# Patient Record
Sex: MALE | Race: WHITE
[De-identification: names, ages, dates, MRNs, and addresses within clinical notes are randomized per-mention and may not be internally consistent; named-entity substitution may affect disease eponyms.]

---

## 2017-05-11 NOTE — EDPHY
H & P


HPI/ROS: 


CHIEF COMPLAINT: Suicidal Ideation, M1 Hold 





HISTORY OF PRESENT ILLNESS: 


The patient is a deaf 76 year male arriving today via EMS from [facility] on 

an M1 hold for suicidal ideation. Per EMS, he wrote down that he was unhappy 

and had plans to take his life. M1 paperwork states he was on his way to the 

bus station and had made threats to staff at [facility] to take the bus to 

[place] where he could access a gun in order to kill himself. Staff reported 

he has threatened cutting his wrists, throat, and stomach as well. [facility] staff at bedside reports he has had intermittent depression and suicidal 

threats since arriving at [facility]. He has a history of depression, 

possible dementia, and COPD.  





HPI partially obtained by  ( at [facility]) and 

from M1 paperwork.





REVIEW OF SYSTEMS:


Constitutional: No fever, no chills


Eyes: No visual changes


ENT: No sore throat


Respiratory: No cough, no shortness of breath


Cardiac: No chest pain


Gastrointestinal: No nausea, no vomiting, no abdominal pain


Genitourinary: No hematuria, no dysuria


Musculoskeletal: No leg pain or swelling


Skin: No rash


Neurological: No headache, no numbness, no weakness


Psychiatric: As in HPI 








Past Medical/Surgical History: 





COPD, Depression, Deaf, Hypertension (unclear if currently taking medication).


Social History: 





Lives at [facility]. Daughters live in [place] and [place]. Has never 

smoked.


Physical Exam: 


General Appearance: Alert, no distress


Eyes: Pupils equal and round, no conjunctival pallor or injection


ENT, Mouth: Deaf, Mucous membranes moist


Neck: Normal inspection


Respiratory: Lungs are clear to auscultation


Cardiovascular: Regular rate and rhythm 


Gastrointestinal:  Abdomen is soft and non- tender 


Neurological:  A&O, nonfocal, normal gait


Skin:  Warm and dry, no rash


Extremities:  Nontender, no pedal edema


Psychiatric: Appears angry





Constitutional: 


 Initial Vital Signs











Temperature (C)  36.9 C   05/11/17 15:56


 


Heart Rate  78   05/11/17 15:56


 


Respiratory Rate  16   05/11/17 15:56


 


Blood Pressure  171/100 H  05/11/17 15:56


 


O2 Sat (%)  94   05/11/17 15:56








 











O2 Delivery Mode               Room Air














Allergies/Adverse Reactions: 


 





No Known Allergies Allergy (Verified 05/11/17 15:55)


 








Home Medications: 














 Medication  Instructions  Recorded


 


2 Htn Medicines  06/03/11


 


Prostate Med  06/03/11


 


Tamsulosin HCl [Flomax]  mg PO DAILY8 06/03/11














Medical Decision Making


ED Course/Re-evaluation: 


15:48 Took EMS report at bedside. 





This is a deaf 76 year old male on M1 hold for suicidal ideation. Police placed 

him on M1 hold when he was found at a bus stop, reportedly planning to travel 

to [place] to acquire a gun and shoot himself. [facility] staff reports 

he has made suicidal statements often for years, but has no prior attempts. On 

presentation he appears angry, but is otherwise alert and stable. Denies SI.





Will proceed with labs and UA for medical clearance prior to mental health 

evaluation.  en route.





Labs obtained. Hypokalemic at 3.1 Will administer 20 meq PO KCl. Tox screen was 

negative.  Plan to proceed with mental evaluation. 





Crisis worker on staff feels that the patient is not acutely suicidal and is 

stable for discharge back to [facility]. I agree with this. The patient will 

be removed from M1 hold at 17:30.





The patient was discharged back to [facility] with strict return precautions 

and instructions for mental health follow-up. 


Differential Diagnosis: 





The differential diagnosis for the patient's depression included but was not 

limited to functional and major depression, situational depression, medication 

side effect, drugs, and alcohol abuse.





- Data Points


Laboratory Results: 


 Laboratory Results





 05/11/17 16:35 





 05/11/17 16:35 





 











  05/11/17 05/11/17 05/11/17





  16:35 16:35 16:14


 


WBC    8.91 10^3/uL 10^3/uL  





    (3.80-9.50)  


 


RBC    4.69 10^6/uL 10^6/uL  





    (4.40-6.38)  


 


Hgb    14.3 g/dL g/dL  





    (13.7-17.5)  


 


Hct    42.3 % %  





    (40.0-51.0)  


 


MCV    90.2 fL fL  





    (81.5-99.8)  


 


MCH    30.5 pg pg  





    (27.9-34.1)  


 


MCHC    33.8 g/dL g/dL  





    (32.4-36.7)  


 


RDW    14.1 % %  





    (11.5-15.2)  


 


Plt Count    171 10^3/uL 10^3/uL  





    (150-400)  


 


MPV    10.1 fL fL  





    (8.7-11.7)  


 


Neut % (Auto)    72.0 % %  





    (39.3-74.2)  


 


Lymph % (Auto)    16.4 % %  





    (15.0-45.0)  


 


Mono % (Auto)    7.4 % %  





    (4.5-13.0)  


 


Eos % (Auto)    2.9 % %  





    (0.6-7.6)  


 


Baso % (Auto)    1.0 % %  





    (0.3-1.7)  


 


Nucleat RBC Rel Count    0.0 % %  





    (0.0-0.2)  


 


Absolute Neuts (auto)    6.41 10^3/uL 10^3/uL  





    (1.70-6.50)  


 


Absolute Lymphs (auto)    1.46 10^3/uL 10^3/uL  





    (1.00-3.00)  


 


Absolute Monos (auto)    0.66 10^3/uL 10^3/uL  





    (0.30-0.80)  


 


Absolute Eos (auto)    0.26 10^3/uL 10^3/uL  





    (0.03-0.40)  


 


Absolute Basos (auto)    0.09 10^3/uL 10^3/uL  





    (0.02-0.10)  


 


Absolute Nucleated RBC    0.00 10^3/uL 10^3/uL  





    (0-0.01)  


 


Immature Gran %    0.3 % %  





    (0.0-1.1)  


 


Immature Gran #    0.03 10^3/uL 10^3/uL  





    (0.00-0.10)  


 


Sodium  143 mEq/L mEq/L    





   (134-144)   


 


Potassium  3.1 mEq/L L mEq/L    





   (3.5-5.2)   


 


Chloride  107 mEq/L mEq/L    





   ()   


 


Carbon Dioxide  25 mEq/l mEq/l    





   (22-31)   


 


Anion Gap  11 mEq/L mEq/L    





   (8-16)   


 


BUN  25 mg/dL H mg/dL    





   (7-23)   


 


Creatinine  1.2 mg/dL mg/dL    





   (0.7-1.3)   


 


Estimated GFR  59     





    


 


Glucose  114 mg/dL H mg/dL    





   ()   


 


Calcium  9.8 mg/dL mg/dL    





   (8.5-10.4)   


 


Urine Opiates Screen      NEGATIVE 





     (NEGATIVE) 


 


Urine Barbiturates      NEGATIVE 





     (NEGATIVE) 


 


Ur Phencyclidine Scrn      NEGATIVE 





     (NEGATIVE) 


 


Ur Amphetamine Screen      NEGATIVE 





     (NEGATIVE) 


 


U Benzodiazepines Scrn      NEGATIVE 





     (NEGATIVE) 


 


Urine Cocaine Screen      NEGATIVE 





     (NEGATIVE) 


 


U Marijuana (THC) Screen      NEGATIVE 





     (NEGATIVE) 


 


Ethyl Alcohol  < 10 mg/dL mg/dL    





   (0-10)   











Medications Given: 


 








Discontinued Medications





Potassium Chloride (Klor-Con)  20 meq PO EDNOW ONE


   Stop: 05/11/17 17:10


   Last Admin: 05/11/17 17:48 Dose:  Not Given








Departure





- Departure


Disposition: Home, Routine, Self-Care


Clinical Impression: 


 Suicidal ideation





Condition: Good


Instructions:  Depression (ED), Suicide Prevention for Older Adults (ED)


Additional Instructions: 


1. Return to the Emergency Department if you feel as though you might hurt 

yourself or others, feel anxious, or for other serious concerns. 


Referrals: 


BENNIE GARNETT [Primary Care Provider] - As per Instructions


Report Scribed for: Magui Talavera


Report Scribed by: Margaret Urias


Date of Report: 05/11/17


Time of Report: 16:08


Physician Review and Approval Statement: 





05/11/17 16:13


Portions of this note were transcribed by a medical scribe.  I personally 

performed a history, physical exam, medical decision making, and confirmed 

accuracy of information the transcribed note.

## 2017-08-14 ENCOUNTER — HOSPITAL ENCOUNTER (EMERGENCY)
Dept: HOSPITAL 80 - FED | Age: 77
Discharge: HOME | End: 2017-08-14
Payer: COMMERCIAL

## 2017-08-14 VITALS — OXYGEN SATURATION: 92 %

## 2017-08-14 VITALS
RESPIRATION RATE: 16 BRPM | TEMPERATURE: 97.9 F | HEART RATE: 78 BPM | SYSTOLIC BLOOD PRESSURE: 145 MMHG | DIASTOLIC BLOOD PRESSURE: 76 MMHG

## 2017-08-14 DIAGNOSIS — J44.9: ICD-10-CM

## 2017-08-14 DIAGNOSIS — R33.9: Primary | ICD-10-CM

## 2017-08-14 DIAGNOSIS — I10: ICD-10-CM

## 2017-08-14 LAB
% IMMATURE GRANULYOCYTES: 0.5 % (ref 0–1.1)
ABSOLUTE IMMATURE GRANULOCYTES: 0.07 10^3/UL (ref 0–0.1)
ABSOLUTE NRBC COUNT: 0 10^3/UL (ref 0–0.01)
ADD DIFF?: NO
ADD MORPH?: NO
ADD SCAN?: NO
ANION GAP SERPL CALC-SCNC: 17 MEQ/L (ref 8–16)
ATYPICAL LYMPHOCYTE FLAG: 0 (ref 0–99)
CALCIUM SERPL-MCNC: 10 MG/DL (ref 8.5–10.4)
CHLORIDE SERPL-SCNC: 106 MEQ/L (ref 97–110)
CO2 SERPL-SCNC: 21 MEQ/L (ref 22–31)
COLOR UR: (no result)
CREAT SERPL-MCNC: 1.1 MG/DL (ref 0.7–1.3)
ERYTHROCYTE [DISTWIDTH] IN BLOOD BY AUTOMATED COUNT: 13.6 % (ref 11.5–15.2)
FRAGMENT RBC FLAG: 0 (ref 0–99)
GFR SERPL CREATININE-BSD FRML MDRD: > 60 ML/MIN/{1.73_M2}
GLUCOSE SERPL-MCNC: 151 MG/DL (ref 70–100)
HCT VFR BLD CALC: 45.8 % (ref 40–51)
HGB BLD-MCNC: 15.5 G/DL (ref 13.7–17.5)
LEFT SHIFT FLG: 0 (ref 0–99)
LIPEMIA HEMOLYSIS FLAG: 90 (ref 0–99)
MCH RBC BLDCO QN: 30.8 PG (ref 27.9–34.1)
MCHC RBC AUTO-ENTMCNC: 33.8 G/DL (ref 32.4–36.7)
MCV RBC AUTO: 91.1 FL (ref 81.5–99.8)
MUCOUS THREADS #/AREA URNS LPF: (no result) /LPF
NITRITE UR QL STRIP: NEGATIVE
NRBC-AUTO%: 0 % (ref 0–0.2)
PH UR STRIP: 7 [PH] (ref 5–7.5)
PLATELET # BLD: 212 10^3/UL (ref 150–400)
PLATELET CLUMPS FLAG: 10 (ref 0–99)
PMV BLD AUTO: 10.8 FL (ref 8.7–11.7)
POTASSIUM SERPL-SCNC: 3.6 MEQ/L (ref 3.5–5.2)
RBC # BLD AUTO: 5.03 10^6/UL (ref 4.4–6.38)
RBC #/AREA URNS HPF: (no result) /HPF (ref 0–3)
SODIUM SERPL-SCNC: 144 MEQ/L (ref 134–144)
SP GR UR STRIP: 1.01 (ref 1–1.03)

## 2017-08-14 NOTE — EDPHY
HPI/HX/ROS/PE/MDM


Narrative: 


CHIEF COMPLAINT:  Urinary retention





HISTORY OF PRESENT ILLNESS:   The patient is a hard-of-hearing 78 y/o male 

arriving from [facility], who presents with urinary retention since last 

night. He has a history of BPH and has required catheterization in the past. 

The last time he urinated was before he went to bed last night. He has 

suprapubic pain that is aggravated by lying down and mildly alleviated  while 

standing. He denies vomiting and fever. History obtained via writing as well as 

.  





REVIEW OF SYSTEMS:


Limitied dure to initial communication barrier.





PAST MEDICAL HISTORY:  COPD, dementia, hypertension, GERD, BPH, depression





SOCIAL HISTORY:   Resides at [facility], [place] and [place], PCP: Dr. Lesli Stone





Prior medical records reviewed including [facility] paperwork that was 

transported with patient today.





VITAL SIGNS: Reviewed by me


GENERAL: Elderly, thin, standing, in obvious discomfort, and non-verbal.


HEENT: Atraumatic. Eyes: No icterus, no injection. Mouth: moist mucous 

membranes.  No erythema or lesions. Neck: supple with no adenopathy.


LUNGS: Clear to auscultation bilaterally, no wheezes, rhonchi or rales.


CARDIAC: Irregular rate and rhythm, no rubs, murmurs or gallops.


ABDOMEN: Tenderness and distension over bladder, soft, bowel sounds normal. No 

guarding or rebound.  Bladder palpable above pubic symphysis.


BACK:  No CVA tenderness.


EXTREMITIES: No trauma. No edema.  Range of motion is normal throughout.


NEURO: Alert and able to follow commands and write,  grossly nonfocal.  


SKIN: Warm and dry, no rash.


PSYCHIATRIC: Normal mentation, no agitation.





Portions of this note were transcribed by a medical scribe.  I personally 

performed a history, physical exam, medical decision making, and confirmed 

accuracy of information the transcribed note.








ED Course: 





The patient is a 78 y/o male who presents with a one day history of urinary 

retention. Plan on a catheter to empty bladder and UA, patient agrees to 

catheter.  is on her way.





1242: Reassessed patient and discussed work up. He is feeling significantly 

improved. His UA indicates possible UTI so he will be discharged with Keflex. I'

ve recommended keeping the catheter in place until follow up with a urologist 

in 1-2 days. He agrees to this follow up plan. Return precautions given.  ASL 

 present to discuss workup as well as my recommendations and to 

answer his questions.  


MDM: 





diff dx for patients urinary retention was considered including UTI, BPH, 

medication effects, bladder outlet pathology, renal failure.





- Data Points


Laboratory Results: 


 Laboratory Results





 08/14/17 09:30 





 08/14/17 09:30 








Medications Given: 


 








Discontinued Medications





Cephalexin HCl (Keflex)  500 mg PO EDNOW ONE


   PRN Reason: Protocol


   Stop: 08/14/17 12:16


   Last Admin: 08/14/17 12:26 Dose:  500 mg





Microbiology Results: 


 MICROBIOLOGY





08/14/17 11:30   Urine,Clean Catch   Urine Culture - Final








General


Time Seen by Provider: 08/14/17 09:44


Initial Vital Signs: 


 Initial Vital Signs











Temperature (C)  37 C   08/14/17 09:20


 


Heart Rate  88   08/14/17 09:20


 


Respiratory Rate  19   08/14/17 09:20


 


Blood Pressure  169/102 H  08/14/17 09:20


 


O2 Sat (%)  92   08/14/17 09:20








 











O2 Delivery Mode               Room Air














Allergies/Adverse Reactions: 


 





No Known Allergies Allergy (Verified 05/11/17 15:55)


 








Home Medications: 














 Medication  Instructions  Recorded


 


2 Htn Medicines  06/03/11


 


Prostate Med  06/03/11


 


Tamsulosin HCl [Flomax]  mg PO DAILY8 06/03/11


 


Cephalexin [Keflex (RX)] 500 mg PO TID 6 Days 08/14/17














Departure





- Departure


Disposition: Home, Routine, Self-Care


Clinical Impression: 


 Urinary retention





Condition: Good


Instructions:  Urinary Retention in Men (ED), Rowan Catheter Placement and Care 

(ED)


Additional Instructions: 


1. Keep your Rowan catheter in place until follow up with urologist.


2. Follow up with your urologist within the next two days. You've been referred 

to Dr. Oseguera if needed.


3. Return to the ED for severe pain, inability to empty your bladder, fever, or 

other worsening symptoms.


4. Take Keflex as prescribed. Be sure to complete entire prescription.


Referrals: 


Patient,NotPresent [Unknown] - As per Instructions


Jeffrey Oseguera MD [Medical Doctor] - As per Instructions


Prescriptions: 


Cephalexin [Keflex (RX)] 500 mg PO TID 6 Days


Report Scribed for: Earlene Elizabeth


Report Scribed by: Candi Martinez


Date of Report: 08/14/17


Time of Report: 10:05

## 2017-09-27 ENCOUNTER — HOSPITAL ENCOUNTER (EMERGENCY)
Dept: HOSPITAL 80 - FED | Age: 77
Discharge: HOME | End: 2017-09-27
Payer: COMMERCIAL

## 2017-09-27 VITALS
SYSTOLIC BLOOD PRESSURE: 155 MMHG | OXYGEN SATURATION: 94 % | DIASTOLIC BLOOD PRESSURE: 88 MMHG | RESPIRATION RATE: 20 BRPM | HEART RATE: 78 BPM

## 2017-09-27 VITALS — TEMPERATURE: 98.4 F

## 2017-09-27 DIAGNOSIS — F03.90: Primary | ICD-10-CM

## 2017-09-27 DIAGNOSIS — I10: ICD-10-CM

## 2017-09-27 DIAGNOSIS — F17.200: ICD-10-CM

## 2017-09-27 LAB
% IMMATURE GRANULYOCYTES: 0.4 % (ref 0–1.1)
ABSOLUTE IMMATURE GRANULOCYTES: 0.03 10^3/UL (ref 0–0.1)
ABSOLUTE NRBC COUNT: 0 10^3/UL (ref 0–0.01)
ADD DIFF?: NO
ADD MORPH?: NO
ADD SCAN?: NO
ANION GAP SERPL CALC-SCNC: 13 MEQ/L (ref 8–16)
ATYPICAL LYMPHOCYTE FLAG: 0 (ref 0–99)
CALCIUM SERPL-MCNC: 10 MG/DL (ref 8.5–10.4)
CHLORIDE SERPL-SCNC: 104 MEQ/L (ref 97–110)
CO2 SERPL-SCNC: 25 MEQ/L (ref 22–31)
COLOR UR: YELLOW
CREAT SERPL-MCNC: 1.2 MG/DL (ref 0.7–1.3)
ERYTHROCYTE [DISTWIDTH] IN BLOOD BY AUTOMATED COUNT: 14.5 % (ref 11.5–15.2)
FRAGMENT RBC FLAG: 0 (ref 0–99)
GFR SERPL CREATININE-BSD FRML MDRD: 58 ML/MIN/{1.73_M2}
GLUCOSE SERPL-MCNC: 104 MG/DL (ref 70–100)
HCT VFR BLD CALC: 43.8 % (ref 40–51)
HGB BLD-MCNC: 14.9 G/DL (ref 13.7–17.5)
LEFT SHIFT FLG: 0 (ref 0–99)
LIPEMIA HEMOLYSIS FLAG: 90 (ref 0–99)
MCH RBC BLDCO QN: 31.2 PG (ref 27.9–34.1)
MCHC RBC AUTO-ENTMCNC: 34 G/DL (ref 32.4–36.7)
MCV RBC AUTO: 91.8 FL (ref 81.5–99.8)
NITRITE UR QL STRIP: NEGATIVE
NRBC-AUTO%: 0 % (ref 0–0.2)
PH UR STRIP: 6 [PH] (ref 5–7.5)
PLATELET # BLD: 163 10^3/UL (ref 150–400)
PLATELET CLUMPS FLAG: 10 (ref 0–99)
PMV BLD AUTO: 9.9 FL (ref 8.7–11.7)
POTASSIUM SERPL-SCNC: 3.6 MEQ/L (ref 3.5–5.2)
RBC # BLD AUTO: 4.77 10^6/UL (ref 4.4–6.38)
RBC #/AREA URNS HPF: (no result) /HPF (ref 0–3)
SODIUM SERPL-SCNC: 142 MEQ/L (ref 134–144)
SP GR UR STRIP: 1.01 (ref 1–1.03)
WBC #/AREA URNS HPF: (no result) /HPF (ref 0–3)

## 2017-09-27 NOTE — EDPHY
H & P


Time Seen by Provider: 09/27/17 19:28


HPI/ROS: 





CHIEF COMPLAINT:  Mild confusion





HISTORY OF PRESENT ILLNESS:  The patient presents to the emergency department 

via paramedics from [facility] with a reported history of increasing 

confusion and mild agitation.  The patient is deaf.  Patient also has a history 

of mild dementia.  In reviewing his records he was seen in the emergency 

department approximately 6 weeks ago with a similar presentation.  At that 

point time he was noted to have a possible urinary tract infection however his 

urine culture demonstrated no obvious bacterial growth.  There has been no 

history of fever, vomiting, diarrhea, acute trauma or other significant 

abnormality reported to nursing staff by paramedics.





REVIEW OF SYSTEMS:


A comprehensive 10 point review of systems is otherwise negative aside from 

elements mentioned in the history of present illness.


Source: Patient, Old records


Exam Limitations: Clinical condition





- Medical/Surgical History


Hx Asthma: No


Hx Chronic Respiratory Disease: No


Hx Diabetes: No


Hx Cardiac Disease: No


Hx Renal Disease: No


Hx Cirrhosis: No


Hx Alcoholism: No


Hx HIV/AIDS: No


Hx Splenectomy or Spleen Trauma: No


Other PMH: HTN, early dementia and pt is deaf





- Social History


Smoking Status: Heavy smoker





- Physical Exam


Exam: 





General Appearance:  Elderly male, no acute distress


Eyes:  Pupils equal and round no pallor or injection


ENT, Mouth:  Dry mucous membranes


Respiratory:  There are no retractions, lungs are clear to auscultation


Cardiovascular:  Regular rate and rhythm


Gastrointestinal:  Abdomen is soft and nontender, no masses, bowel sounds normal


Neurological:  5/5 strength noted all 4 extremities, chronically deaf


Skin:  Warm and dry, no rashes


Musculoskeletal:  Neck is supple nontender


Extremities:  symmetrical, full range of motion


Psychiatric:  Cooperative, non agitated


Constitutional: 


 Initial Vital Signs











Temperature (C)  36.9 C   09/27/17 19:54


 


Heart Rate  93   09/27/17 19:54


 


Respiratory Rate  18   09/27/17 19:54


 


Blood Pressure  153/77 H  09/27/17 19:54


 


O2 Sat (%)  93   09/27/17 19:54








 











O2 Delivery Mode               Room Air














Allergies/Adverse Reactions: 


 





No Known Allergies Allergy (Verified 05/11/17 15:55)


 








Home Medications: 














 Medication  Instructions  Recorded


 


2 Htn Medicines  06/03/11


 


Prostate Med  06/03/11


 


Tamsulosin HCl [Flomax] mg PO DAILY8 06/03/11


 


Cephalexin [Keflex (RX)] 500 mg PO TID 6 Days  cap 08/14/17














Medical Decision Making


ED Course/Re-evaluation: 





Through the use of a  the patient informs me that he 

was agitated with staff that he could not receive a meal at his skilled nursing 

facility.  The patient was given a meal in the emergency department and is 

feeling much better.  He would like to be discharged back to his SNF.  The 

patient has stable vital signs.  His urine dipstick demonstrates no evidence of 

an acute infection.  The patient's CBC and serum chemistries are within normal 

limits.  At this point time I doubt the patient is presenting with acute 

delirium or psychosis.  I do feel the patient can be discharged back to his 

skilled nursing facility.








Differential Diagnosis: 





Differential diagnosis considered includes urinary tract infection, dehydration

, metabolic abnormality, psychosis, delirium





- Data Points


Laboratory Results: 


 Laboratory Results





 09/27/17 19:52 





 09/27/17 19:52 





 











  09/27/17 09/27/17 09/27/17





  19:52 19:52 19:52


 


WBC      8.47 10^3/uL 10^3/uL





     (3.80-9.50) 


 


RBC      4.77 10^6/uL 10^6/uL





     (4.40-6.38) 


 


Hgb      14.9 g/dL g/dL





     (13.7-17.5) 


 


Hct      43.8 % %





     (40.0-51.0) 


 


MCV      91.8 fL fL





     (81.5-99.8) 


 


MCH      31.2 pg pg





     (27.9-34.1) 


 


MCHC      34.0 g/dL g/dL





     (32.4-36.7) 


 


RDW      14.5 % %





     (11.5-15.2) 


 


Plt Count      163 10^3/uL 10^3/uL





     (150-400) 


 


MPV      9.9 fL fL





     (8.7-11.7) 


 


Neut % (Auto)      72.3 % %





     (39.3-74.2) 


 


Lymph % (Auto)      18.3 % %





     (15.0-45.0) 


 


Mono % (Auto)      8.0 % %





     (4.5-13.0) 


 


Eos % (Auto)      0.4 % L %





     (0.6-7.6) 


 


Baso % (Auto)      0.6 % %





     (0.3-1.7) 


 


Nucleat RBC Rel Count      0.0 % %





     (0.0-0.2) 


 


Absolute Neuts (auto)      6.13 10^3/uL 10^3/uL





     (1.70-6.50) 


 


Absolute Lymphs (auto)      1.55 10^3/uL 10^3/uL





     (1.00-3.00) 


 


Absolute Monos (auto)      0.68 10^3/uL 10^3/uL





     (0.30-0.80) 


 


Absolute Eos (auto)      0.03 10^3/uL 10^3/uL





     (0.03-0.40) 


 


Absolute Basos (auto)      0.05 10^3/uL 10^3/uL





     (0.02-0.10) 


 


Absolute Nucleated RBC      0.00 10^3/uL 10^3/uL





     (0-0.01) 


 


Immature Gran %      0.4 % %





     (0.0-1.1) 


 


Immature Gran #      0.03 10^3/uL 10^3/uL





     (0.00-0.10) 


 


Sodium    142 mEq/L mEq/L  





    (134-144)  


 


Potassium    3.6 mEq/L mEq/L  





    (3.5-5.2)  


 


Chloride    104 mEq/L mEq/L  





    ()  


 


Carbon Dioxide    25 mEq/l mEq/l  





    (22-31)  


 


Anion Gap    13 mEq/L mEq/L  





    (8-16)  


 


BUN    32 mg/dL H mg/dL  





    (7-23)  


 


Creatinine    1.2 mg/dL mg/dL  





    (0.7-1.3)  


 


Estimated GFR    58   





    


 


Glucose    104 mg/dL H mg/dL  





    ()  


 


Calcium    10.0 mg/dL mg/dL  





    (8.5-10.4)  


 


Urine Color  YELLOW     





    


 


Urine Appearance  CLEAR     





    


 


Urine pH  6.0     





   (5.0-7.5)   


 


Ur Specific Gravity  1.015     





   (1.002-1.030)   


 


Urine Protein  1+  H     





   (NEGATIVE)   


 


Urine Ketones  NEGATIVE     





   (NEGATIVE)   


 


Urine Blood  NEGATIVE     





   (NEGATIVE)   


 


Urine Nitrate  NEGATIVE     





   (NEGATIVE)   


 


Urine Bilirubin  NEGATIVE     





   (NEGATIVE)   


 


Urine Urobilinogen  NEGATIVE EU EU    





   (0.2-1.0)   


 


Ur Leukocyte Esterase  NEGATIVE     





   (NEGATIVE)   


 


Urine RBC  3-5 /hpf H /hpf    





   (0-3)   


 


Urine WBC  1-3 /hpf /hpf    





   (0-3)   


 


Ur Epithelial Cells  TRACE /lpf /lpf    





   (NONE-1+)   


 


Urine Glucose  NEGATIVE     





   (NEGATIVE)   














Departure





- Departure


Disposition: Home, Routine, Self-Care


Clinical Impression: 


 Dementia





Condition: Good


Instructions:  Dementia (ED)


Additional Instructions: 


1.  Please return to the emergency department for any fever, vomiting, pain or 

other concerns.


2.  Please follow up with your primary care provider as scheduled.


3.  The testing in the emergency department today demonstrates no evidence of 

urinary retention or a urinary tract infection.  Your laboratory tests 

including CBC and serum chemistries are within normal limits.








Referrals: 


Patient,NotPresent [Unknown] - As per Instructions

## 2017-10-19 ENCOUNTER — HOSPITAL ENCOUNTER (INPATIENT)
Dept: HOSPITAL 80 - FED | Age: 77
LOS: 3 days | Discharge: NURSING, REHAB OR LONG-TERM CARE | DRG: 866 | End: 2017-10-22
Attending: INTERNAL MEDICINE | Admitting: FAMILY MEDICINE
Payer: COMMERCIAL

## 2017-10-19 DIAGNOSIS — F05: ICD-10-CM

## 2017-10-19 DIAGNOSIS — H91.93: ICD-10-CM

## 2017-10-19 DIAGNOSIS — R82.71: ICD-10-CM

## 2017-10-19 DIAGNOSIS — I10: ICD-10-CM

## 2017-10-19 DIAGNOSIS — K21.9: ICD-10-CM

## 2017-10-19 DIAGNOSIS — J44.9: ICD-10-CM

## 2017-10-19 DIAGNOSIS — F17.210: ICD-10-CM

## 2017-10-19 DIAGNOSIS — N40.0: ICD-10-CM

## 2017-10-19 DIAGNOSIS — F03.90: ICD-10-CM

## 2017-10-19 DIAGNOSIS — B34.9: Primary | ICD-10-CM

## 2017-10-19 LAB
% IMMATURE GRANULYOCYTES: 0.4 % (ref 0–1.1)
ABSOLUTE IMMATURE GRANULOCYTES: 0.08 10^3/UL (ref 0–0.1)
ABSOLUTE NRBC COUNT: 0 10^3/UL (ref 0–0.01)
ADD DIFF?: NO
ADD MORPH?: NO
ADD SCAN?: NO
ANION GAP SERPL CALC-SCNC: 11 MEQ/L (ref 8–16)
APTT BLD: 33.7 SEC (ref 23–38)
ATYPICAL LYMPHOCYTE FLAG: 0 (ref 0–99)
BILIRUB SERPL-MCNC: 1.3 MG/DL (ref 0.1–1.4)
CALCIUM SERPL-MCNC: 9.8 MG/DL (ref 8.5–10.4)
CHLORIDE SERPL-SCNC: 106 MEQ/L (ref 97–110)
CO2 SERPL-SCNC: 25 MEQ/L (ref 22–31)
COLOR UR: YELLOW
CREAT SERPL-MCNC: 1.2 MG/DL (ref 0.7–1.3)
ERYTHROCYTE [DISTWIDTH] IN BLOOD BY AUTOMATED COUNT: 14.2 % (ref 11.5–15.2)
FRAGMENT RBC FLAG: 0 (ref 0–99)
GFR SERPL CREATININE-BSD FRML MDRD: 58 ML/MIN/{1.73_M2}
GLUCOSE SERPL-MCNC: 132 MG/DL (ref 70–100)
HCT VFR BLD CALC: 43.6 % (ref 40–51)
HGB BLD-MCNC: 15 G/DL (ref 13.7–17.5)
HYALINE CASTS #/AREA URNS LPF: (no result) /LPF (ref 0–1)
INR PPP: 1.19 (ref 0.83–1.16)
LEFT SHIFT FLG: 0 (ref 0–99)
LIPEMIA HEMOLYSIS FLAG: 90 (ref 0–99)
MCH RBC BLDCO QN: 31.2 PG (ref 27.9–34.1)
MCHC RBC AUTO-ENTMCNC: 34.4 G/DL (ref 32.4–36.7)
MCV RBC AUTO: 90.6 FL (ref 81.5–99.8)
MUCOUS THREADS #/AREA URNS LPF: (no result) /LPF
NITRITE UR QL STRIP: NEGATIVE
NRBC-AUTO%: 0 % (ref 0–0.2)
PH UR STRIP: 6 [PH] (ref 5–7.5)
PLATELET # BLD: 159 10^3/UL (ref 150–400)
PLATELET CLUMPS FLAG: 20 (ref 0–99)
PMV BLD AUTO: 10.3 FL (ref 8.7–11.7)
POTASSIUM SERPL-SCNC: 3.7 MEQ/L (ref 3.5–5.2)
PROTHROMBIN TIME: 15.1 SEC (ref 12–15)
RBC # BLD AUTO: 4.81 10^6/UL (ref 4.4–6.38)
RBC #/AREA URNS HPF: (no result) /HPF (ref 0–3)
SODIUM SERPL-SCNC: 142 MEQ/L (ref 134–144)
SP GR UR STRIP: 1.02 (ref 1–1.03)
WBC #/AREA URNS HPF: (no result) /HPF (ref 0–3)

## 2017-10-19 RX ADMIN — PROPRANOLOL HYDROCHLORIDE SCH MG: 40 TABLET ORAL at 20:35

## 2017-10-19 NOTE — EDPHY
H & P


Time Seen by Provider: 10/19/17 16:25


HPI/ROS: 





CHIEF COMPLAINT:  Fell twice today





HISTORY OF PRESENT ILLNESS:  Patient is a resident of [facility] with a 

history of dementia and prostatic hypertrophy.  He is brought in today because 

he fell twice.


Patient is deaf and communicates by writing.  He currently has no complaints.  

He specifically denies any pain, cough, shortness of breath, abdominal pain or 

vomiting or diarrhea.





REVIEW OF SYSTEMS:


Further history and review of systems is limited by the patient's deafness, but 

generally denies any medical complaints or traumatic injury.





PAST MEDICAL HISTORY:  Includes dementia, prostatic hypertrophy, hypertension, 

COPD, GERD





Social history:  [facility] resident





General Appearance: Alert and conversant, cooperative.


Eyes: No scleral  icterus. 


ENT, Mouth: Normal mucous membranes.


Respiratory: Normal respiratory effort, breath sounds equal, lungs are clear to 

auscultation.


Cardiovascular:  Regular rate and rhythm.


Gastrointestinal:  Abdomen is soft and non tender.


Neurological:  Patient is alert and follows commands.   Communicates by 

writing.  Face symmetric, normal movement and sensation in all extremities.


Skin: Warm and dry, no rashes.


Musculoskeletal:  No spinal or extremity tenderness or deformity.  No external 

evidence of head trauma.


Psychiatric:  Not agitated, but unable because he is nonverbal.





Emergency Department course/MDM:


Temperature 37.9degrees.


Labs, lactate, urinalysis, chest x-ray.


Discussed with Dr. Solorzano at 1656, [facility].  She requests keep at [facility] 

for admission tonight, admission for IV antibiotics with UTI and new gait 

instability.





1725:  Head CT negative for trauma per Oppenheimer, chest x-ray negative for 

pneumonia personally reviewed by myself.





Sepsis but not severe sepsis.  IV fluids and ceftriaxone 1g, observation 

admission.


Patient is prescribed Klonopin, 0.5 mg orally and 5 mg oral Zyprexa.


Right now he is alert and ambulatory and a little bit agitated.  I think he has 

clear dementia and although he seems upset about admission I do not think he 

has capacity to make decisions about being discharged and he is full cor.


Smoking Status: Heavy smoker


Constitutional: 


 Initial Vital Signs











Temperature (C)  37.8 C   10/19/17 17:20


 


Heart Rate  87   10/19/17 17:20


 


Respiratory Rate  16   10/19/17 17:20


 


Blood Pressure  158/91 H  10/19/17 17:20


 


O2 Sat (%)  92   10/19/17 17:20








 











O2 Delivery Mode               Room Air














Allergies/Adverse Reactions: 


 





iodine Allergy (Verified 10/19/17 17:47)


 








Home Medications: 














 Medication  Instructions  Recorded


 


Acetaminophen [Tylenol 325mg (*)] 650 mg PO Q6 PRN 10/19/17


 


Albuterol [Proventil Inhaler HFA 2 puffs IH Q4H PRN 10/19/17





(*)]  


 


Cholecalciferol Vit D3 [Vitamin D3 1,000 units PO DAILY 10/19/17





(*)]  


 


Escitalopram Oxalate [Lexapro] 10 mg PO DAILY 10/19/17


 


Finasteride [Proscar 5 MG (*)] 5 mg PO DAILY 10/19/17


 


Propranolol HCl 80 mg PO BID 10/19/17


 


SUMAtriptan [Imitrex 50 MG (*)] 50 mg PO DAILY PRN 10/19/17


 


Sennosides [Senokot 8.6mg (OTC)] 1 each PO BID PRN 10/19/17


 


Tiotropium Inhaler [Spiriva 1 inh IH DAILY 10/19/17





Inhaler]  


 


clonazePAM [Klonopin (*)] 0.5 mg PO HS 10/19/17


 


clonazePAM [Klonopin (*)] 0.5 mg PO Q8 PRN 10/19/17


 


traZODone [traZODONE 50MG (*)] 50 mg PO HS 10/19/17














Medical Decision Making





- Diagnostics


Imaging Results: 


 Imaging Impressions





Chest X-Ray  10/19/17 16:25


Impression: There is no focal infiltrate identified.








Head CT  10/19/17 16:28


Impression:1. No acute posttraumatic abnormality identified.


2. Severe cerebral atrophy with extensive likely chronic white matter disease 

and multiple old lacunes.


 


Results called and discussed with CHANA STONE, at 10/19/2017 17:24


 


General information for patients regarding this examination can be found at 

[email or web address].


 


If you have questions or comments about this report, please contact me at [phone] (hospital) or [phone] (cell). 


 











Differential Diagnosis: 





Differential for fever and falling considered including but not limited to 

pneumonia, UTI, influenza, sepsis.


Consult/Admit Bed Type: [place] [id number]





- Data Points


Laboratory Results: 


 Laboratory Results





 10/19/17 17:00 





 10/19/17 17:00 





 











  10/19/17 10/19/17 10/19/17





  17:00 17:00 17:00


 


WBC      20.01 10^3/uL H 10^3/uL





     (3.80-9.50) 


 


RBC      4.81 10^6/uL 10^6/uL





     (4.40-6.38) 


 


Hgb      15.0 g/dL g/dL





     (13.7-17.5) 


 


Hct      43.6 % %





     (40.0-51.0) 


 


MCV      90.6 fL fL





     (81.5-99.8) 


 


MCH      31.2 pg pg





     (27.9-34.1) 


 


MCHC      34.4 g/dL g/dL





     (32.4-36.7) 


 


RDW      14.2 % %





     (11.5-15.2) 


 


Plt Count      159 10^3/uL 10^3/uL





     (150-400) 


 


MPV      10.3 fL fL





     (8.7-11.7) 


 


Neut % (Auto)      87.3 % H %





     (39.3-74.2) 


 


Lymph % (Auto)      5.3 % L %





     (15.0-45.0) 


 


Mono % (Auto)      6.7 % %





     (4.5-13.0) 


 


Eos % (Auto)      0.0 % L %





     (0.6-7.6) 


 


Baso % (Auto)      0.3 % %





     (0.3-1.7) 


 


Nucleat RBC Rel Count      0.0 % %





     (0.0-0.2) 


 


Absolute Neuts (auto)      17.45 10^3/uL H 10^3/uL





     (1.70-6.50) 


 


Absolute Lymphs (auto)      1.06 10^3/uL 10^3/uL





     (1.00-3.00) 


 


Absolute Monos (auto)      1.35 10^3/uL H 10^3/uL





     (0.30-0.80) 


 


Absolute Eos (auto)      0.01 10^3/uL L 10^3/uL





     (0.03-0.40) 


 


Absolute Basos (auto)      0.06 10^3/uL 10^3/uL





     (0.02-0.10) 


 


Absolute Nucleated RBC      0.00 10^3/uL 10^3/uL





     (0-0.01) 


 


Immature Gran %      0.4 % %





     (0.0-1.1) 


 


Immature Gran #      0.08 10^3/uL 10^3/uL





     (0.00-0.10) 


 


PT    15.1 SEC H SEC  





    (12.0-15.0)  


 


INR    1.19  H   





    (0.83-1.16)  


 


APTT    33.7 SEC SEC  





    (23.0-38.0)  


 


VBG Lactic Acid      





    


 


Sodium  142 mEq/L mEq/L    





   (134-144)   


 


Potassium  3.7 mEq/L mEq/L    





   (3.5-5.2)   


 


Chloride  106 mEq/L mEq/L    





   ()   


 


Carbon Dioxide  25 mEq/l mEq/l    





   (22-31)   


 


Anion Gap  11 mEq/L mEq/L    





   (8-16)   


 


BUN  34 mg/dL H mg/dL    





   (7-23)   


 


Creatinine  1.2 mg/dL mg/dL    





   (0.7-1.3)   


 


Estimated GFR  58     





    


 


Glucose  132 mg/dL H mg/dL    





   ()   


 


Calcium  9.8 mg/dL mg/dL    





   (8.5-10.4)   


 


Total Bilirubin  1.3 mg/dL mg/dL    





   (0.1-1.4)   


 


Urine Color      





    


 


Urine Appearance      





    


 


Urine pH      





    


 


Ur Specific Gravity      





    


 


Urine Protein      





    


 


Urine Ketones      





    


 


Urine Blood      





    


 


Urine Nitrate      





    


 


Urine Bilirubin      





    


 


Urine Urobilinogen      





    


 


Ur Leukocyte Esterase      





    


 


Urine RBC      





    


 


Urine WBC      





    


 


Ur Epithelial Cells      





    


 


Hyaline Casts      





    


 


Urine Mucus      





    


 


Urine Glucose      





    














  10/19/17 10/19/17





  17:00 16:35


 


WBC    





   


 


RBC    





   


 


Hgb    





   


 


Hct    





   


 


MCV    





   


 


MCH    





   


 


MCHC    





   


 


RDW    





   


 


Plt Count    





   


 


MPV    





   


 


Neut % (Auto)    





   


 


Lymph % (Auto)    





   


 


Mono % (Auto)    





   


 


Eos % (Auto)    





   


 


Baso % (Auto)    





   


 


Nucleat RBC Rel Count    





   


 


Absolute Neuts (auto)    





   


 


Absolute Lymphs (auto)    





   


 


Absolute Monos (auto)    





   


 


Absolute Eos (auto)    





   


 


Absolute Basos (auto)    





   


 


Absolute Nucleated RBC    





   


 


Immature Gran %    





   


 


Immature Gran #    





   


 


PT    





   


 


INR    





   


 


APTT    





   


 


VBG Lactic Acid  0.9 mmol/L mmol/L  





   (0.7-2.1)  


 


Sodium    





   


 


Potassium    





   


 


Chloride    





   


 


Carbon Dioxide    





   


 


Anion Gap    





   


 


BUN    





   


 


Creatinine    





   


 


Estimated GFR    





   


 


Glucose    





   


 


Calcium    





   


 


Total Bilirubin    





   


 


Urine Color    YELLOW 





   


 


Urine Appearance    CLEAR 





   


 


Urine pH    6.0 





    (5.0-7.5) 


 


Ur Specific Gravity    1.016 





    (1.002-1.030) 


 


Urine Protein    1+  H 





    (NEGATIVE) 


 


Urine Ketones    NEGATIVE 





    (NEGATIVE) 


 


Urine Blood    NEGATIVE 





    (NEGATIVE) 


 


Urine Nitrate    NEGATIVE 





    (NEGATIVE) 


 


Urine Bilirubin    NEGATIVE 





    (NEGATIVE) 


 


Urine Urobilinogen    2.0 EU H EU





    (0.2-1.0) 


 


Ur Leukocyte Esterase    TRACE  H 





    (NEGATIVE) 


 


Urine RBC    3-5 /hpf H /hpf





    (0-3) 


 


Urine WBC    25-50 /hpf H /hpf





    (0-3) 


 


Ur Epithelial Cells    TRACE /lpf /lpf





    (NONE-1+) 


 


Hyaline Casts    1-5 /lpf /lpf





    (0-1) 


 


Urine Mucus    TRACE /lpf /lpf





    (NONE-1+) 


 


Urine Glucose    NEGATIVE 





    (NEGATIVE) 











Medications Given: 


 








Discontinued Medications





Clonazepam (Klonopin)  0.5 mg PO EDNOW ONE


   Stop: 10/19/17 17:32


   Last Admin: 10/19/17 18:23 Dose:  0.5 mg


Ceftriaxone Sodium/Dextrose (Rocephin 1 Gm (Premix))  50 mls @ 100 mls/hr IV 

EDNOW ONE


   PRN Reason: Protocol


   Stop: 10/19/17 17:56


   Last Admin: 10/19/17 18:22 Dose:  50 mls


Sodium Chloride (Ns)  1,000 mls @ 0 mls/hr IV EDNOW ONE; Wide Open


   PRN Reason: Protocol


   Stop: 10/19/17 17:28


   Last Admin: 10/19/17 18:23 Dose:  1,000 mls


Olanzapine (Zyprexa Zydis)  5 mg PO EDNOW ONE


   Stop: 10/19/17 17:33


   Last Admin: 10/19/17 18:23 Dose:  5 mg








Departure





- Departure


Disposition: [facility]


Clinical Impression: 


 Pyelonephritis, acute





Condition: Good

## 2017-10-19 NOTE — PDGENHP
History and Physical





- Chief Complaint


weakness/fell





- History of Present Illness


This is a 78 yo male from [facility] who has fallen multiple times in the 

past 3 days. He is deaf and there is a  in the room. 

He has remained afebrile. He is slightly confused. He says he has been feeling 

weak lately and has fallen several times. BP stable. No tachycardia.





In the E.D. UA is c/w likely acute cystitis. WBC is elevated at 20. 





He denies Resp sx's. He does not have abd pain. He has had some increased 

urinary frequency but no urgency.  lactic acid was unremarkable. BMP 

unremarkable








PMHx:





-Dementia


-Deafness


-BPH


-HTN


-COPD, on RA


-GERD





Soc Hx: tobacco abuse disorder, lives in nursing home





FmHx: unknown





History Information





- Allergies/Home Medication List


Allergies/Adverse Reactions: 








iodine Allergy (Verified 10/19/17 17:47)


 





Home Medications: 








Acetaminophen [Tylenol 325mg (*)] 650 mg PO Q6 PRN 10/19/17 [Last Taken Unknown]


Albuterol [Proventil Inhaler HFA (*)] 2 puffs IH Q4H PRN 10/19/17 [Last Taken 

Unknown]


Cholecalciferol Vit D3 [Vitamin D3 (*)] 1,000 units PO DAILY 10/19/17 [Last 

Taken Unknown]


Escitalopram Oxalate [Lexapro] 10 mg PO DAILY 10/19/17 [Last Taken Unknown]


Finasteride [Proscar 5 MG (*)] 5 mg PO DAILY 10/19/17 [Last Taken Unknown]


Propranolol HCl 80 mg PO BID 10/19/17 [Last Taken Unknown]


SUMAtriptan [Imitrex 50 MG (*)] 50 mg PO DAILY PRN 10/19/17 [Last Taken Unknown]


Sennosides [Senokot 8.6mg (OTC)] 1 each PO BID PRN 10/19/17 [Last Taken Unknown]


Tiotropium Inhaler [Spiriva Inhaler] 1 inh IH DAILY 10/19/17 [Last Taken Unknown

]


clonazePAM [Klonopin (*)] 0.5 mg PO HS 10/19/17 [Last Taken Unknown]


clonazePAM [Klonopin (*)] 0.5 mg PO Q8 PRN 10/19/17 [Last Taken Unknown]


traZODone [traZODONE 50MG (*)] 50 mg PO HS 10/19/17 [Last Taken Unknown]





I have personally reviewed and updated: medical history, social history





- Social History


Smoking Status: Heavy smoker





Review of Systems


Review of Systems: 





ROS: 10pt was reviewed & negative except for what was stated in HPI & below





Physical Exam


Physical Exam: 

















Temp Pulse Resp BP Pulse Ox


 


 37.8 C   87   16   158/91 H  92 


 


 10/19/17 17:20  10/19/17 17:20  10/19/17 17:20  10/19/17 17:20  10/19/17 17:20











Constitutional: no apparent distress


Eyes: PERRL, EOMI


Ears, Nose, Mouth, Throat: dry mucous membranes


Cardiovascular: regular rate and rhythym, no murmur, rub, or gallop


Respiratory: no respiratory distress, no rales or rhonchi, clear to auscultation


Gastrointestinal: normoactive bowel sounds, soft, non-tender abdomen


Skin: warm


Musculoskeletal: no muscle tenderness, other (no CVA tenderness)


Neurologic: No AAOx3


Psychiatric: encephalopathic, anxious, No interacting appropriately, No not 

anxious





Lab Data & Imaging Review





 10/19/17 17:00





 10/19/17 17:00














WBC  20.01 10^3/uL (3.80-9.50)  H  10/19/17  17:00    


 


RBC  4.81 10^6/uL (4.40-6.38)   10/19/17  17:00    


 


Hgb  15.0 g/dL (13.7-17.5)   10/19/17  17:00    


 


Hct  43.6 % (40.0-51.0)   10/19/17  17:00    


 


MCV  90.6 fL (81.5-99.8)   10/19/17  17:00    


 


MCH  31.2 pg (27.9-34.1)   10/19/17  17:00    


 


MCHC  34.4 g/dL (32.4-36.7)   10/19/17  17:00    


 


RDW  14.2 % (11.5-15.2)   10/19/17  17:00    


 


Plt Count  159 10^3/uL (150-400)   10/19/17  17:00    


 


MPV  10.3 fL (8.7-11.7)   10/19/17  17:00    


 


Neut % (Auto)  87.3 % (39.3-74.2)  H  10/19/17  17:00    


 


Lymph % (Auto)  5.3 % (15.0-45.0)  L  10/19/17  17:00    


 


Mono % (Auto)  6.7 % (4.5-13.0)   10/19/17  17:00    


 


Eos % (Auto)  0.0 % (0.6-7.6)  L  10/19/17  17:00    


 


Baso % (Auto)  0.3 % (0.3-1.7)   10/19/17  17:00    


 


Nucleat RBC Rel Count  0.0 % (0.0-0.2)   10/19/17  17:00    


 


Absolute Neuts (auto)  17.45 10^3/uL (1.70-6.50)  H  10/19/17  17:00    


 


Absolute Lymphs (auto)  1.06 10^3/uL (1.00-3.00)   10/19/17  17:00    


 


Absolute Monos (auto)  1.35 10^3/uL (0.30-0.80)  H  10/19/17  17:00    


 


Absolute Eos (auto)  0.01 10^3/uL (0.03-0.40)  L  10/19/17  17:00    


 


Absolute Basos (auto)  0.06 10^3/uL (0.02-0.10)   10/19/17  17:00    


 


Absolute Nucleated RBC  0.00 10^3/uL (0-0.01)   10/19/17  17:00    


 


Immature Gran %  0.4 % (0.0-1.1)   10/19/17  17:00    


 


Immature Gran #  0.08 10^3/uL (0.00-0.10)   10/19/17  17:00    


 


PT  15.1 SEC (12.0-15.0)  H  10/19/17  17:00    


 


INR  1.19  (0.83-1.16)  H  10/19/17  17:00    


 


APTT  33.7 SEC (23.0-38.0)   10/19/17  17:00    


 


VBG Lactic Acid  0.9 mmol/L (0.7-2.1)   10/19/17  17:00    


 


Sodium  142 mEq/L (134-144)   10/19/17  17:00    


 


Potassium  3.7 mEq/L (3.5-5.2)   10/19/17  17:00    


 


Chloride  106 mEq/L ()   10/19/17  17:00    


 


Carbon Dioxide  25 mEq/l (22-31)   10/19/17  17:00    


 


Anion Gap  11 mEq/L (8-16)   10/19/17  17:00    


 


BUN  34 mg/dL (7-23)  H  10/19/17  17:00    


 


Creatinine  1.2 mg/dL (0.7-1.3)   10/19/17  17:00    


 


Estimated GFR  58   10/19/17  17:00    


 


Glucose  132 mg/dL ()  H  10/19/17  17:00    


 


Calcium  9.8 mg/dL (8.5-10.4)   10/19/17  17:00    


 


Total Bilirubin  1.3 mg/dL (0.1-1.4)   10/19/17  17:00    


 


Urine Color  YELLOW   10/19/17  16:35    


 


Urine Appearance  CLEAR   10/19/17  16:35    


 


Urine pH  6.0  (5.0-7.5)   10/19/17  16:35    


 


Ur Specific Gravity  1.016  (1.002-1.030)   10/19/17  16:35    


 


Urine Protein  1+  (NEGATIVE)  H  10/19/17  16:35    


 


Urine Ketones  NEGATIVE  (NEGATIVE)   10/19/17  16:35    


 


Urine Blood  NEGATIVE  (NEGATIVE)   10/19/17  16:35    


 


Urine Nitrate  NEGATIVE  (NEGATIVE)   10/19/17  16:35    


 


Urine Bilirubin  NEGATIVE  (NEGATIVE)   10/19/17  16:35    


 


Urine Urobilinogen  2.0 EU (0.2-1.0)  H  10/19/17  16:35    


 


Ur Leukocyte Esterase  TRACE  (NEGATIVE)  H  10/19/17  16:35    


 


Urine RBC  3-5 /hpf (0-3)  H  10/19/17  16:35    


 


Urine WBC  25-50 /hpf (0-3)  H  10/19/17  16:35    


 


Ur Epithelial Cells  TRACE /lpf (NONE-1+)   10/19/17  16:35    


 


Hyaline Casts  1-5 /lpf (0-1)   10/19/17  16:35    


 


Urine Mucus  TRACE /lpf (NONE-1+)   10/19/17  16:35    


 


Urine Glucose  NEGATIVE  (NEGATIVE)   10/19/17  16:35    











Assessment & Plan


Assessment: 








#Acute cystitis: no e/o of pyelonephritis


#Dehydration


#Weakness and Deconditioning


#Encephalopathy in patient with hx of demenia


#Deafness


#HTN


#BPH





Plan:





-admit


-overall looks stable


-Provide additional IVF


-Cont with Rocephin


-Await cultures


-PT/OT


-home meds


-bp is slightly elevated, will allow some permissive HTN tonight. 


-full code

## 2017-10-20 VITALS — RESPIRATION RATE: 16 BRPM

## 2017-10-20 LAB
% IMMATURE GRANULYOCYTES: 0.6 % (ref 0–1.1)
ABSOLUTE IMMATURE GRANULOCYTES: 0.13 10^3/UL (ref 0–0.1)
ABSOLUTE NRBC COUNT: 0 10^3/UL (ref 0–0.01)
ADD DIFF?: NO
ADD MORPH?: NO
ADD SCAN?: NO
ANION GAP SERPL CALC-SCNC: 12 MEQ/L (ref 8–16)
ATYPICAL LYMPHOCYTE FLAG: 0 (ref 0–99)
CALCIUM SERPL-MCNC: 8.8 MG/DL (ref 8.5–10.4)
CHLORIDE SERPL-SCNC: 107 MEQ/L (ref 97–110)
CO2 SERPL-SCNC: 23 MEQ/L (ref 22–31)
CREAT SERPL-MCNC: 1.2 MG/DL (ref 0.7–1.3)
ERYTHROCYTE [DISTWIDTH] IN BLOOD BY AUTOMATED COUNT: 14.1 % (ref 11.5–15.2)
FRAGMENT RBC FLAG: 0 (ref 0–99)
GFR SERPL CREATININE-BSD FRML MDRD: 58 ML/MIN/{1.73_M2}
GLUCOSE SERPL-MCNC: 114 MG/DL (ref 70–100)
HCT VFR BLD CALC: 38.1 % (ref 40–51)
HGB BLD-MCNC: 13.3 G/DL (ref 13.7–17.5)
LEFT SHIFT FLG: 0 (ref 0–99)
LIPEMIA HEMOLYSIS FLAG: 90 (ref 0–99)
MAGNESIUM SERPL-MCNC: 1.9 MG/DL (ref 1.6–2.3)
MCH RBC BLDCO QN: 31.9 PG (ref 27.9–34.1)
MCHC RBC AUTO-ENTMCNC: 34.9 G/DL (ref 32.4–36.7)
MCV RBC AUTO: 91.4 FL (ref 81.5–99.8)
NRBC-AUTO%: 0 % (ref 0–0.2)
PLATELET # BLD: 127 10^3/UL (ref 150–400)
PLATELET CLUMPS FLAG: 0 (ref 0–99)
PMV BLD AUTO: 10.4 FL (ref 8.7–11.7)
POTASSIUM SERPL-SCNC: 3.4 MEQ/L (ref 3.5–5.2)
RBC # BLD AUTO: 4.17 10^6/UL (ref 4.4–6.38)
SODIUM SERPL-SCNC: 142 MEQ/L (ref 134–144)

## 2017-10-20 RX ADMIN — VITAMIN D, TAB 1000IU (100/BT) SCH: 25 TAB at 10:51

## 2017-10-20 RX ADMIN — PROPRANOLOL HYDROCHLORIDE SCH MG: 40 TABLET ORAL at 20:32

## 2017-10-20 RX ADMIN — FINASTERIDE SCH MG: 5 TABLET, FILM COATED ORAL at 09:20

## 2017-10-20 RX ADMIN — PROPRANOLOL HYDROCHLORIDE SCH MG: 40 TABLET ORAL at 11:47

## 2017-10-20 RX ADMIN — PROPRANOLOL HYDROCHLORIDE SCH: 40 TABLET ORAL at 10:41

## 2017-10-20 RX ADMIN — SODIUM CHLORIDE SCH MLS: 900 INJECTION, SOLUTION INTRAVENOUS at 09:20

## 2017-10-20 RX ADMIN — VITAMIN D, TAB 1000IU (100/BT) SCH UNITS: 25 TAB at 09:20

## 2017-10-20 RX ADMIN — FINASTERIDE SCH MG: 5 TABLET, FILM COATED ORAL at 11:47

## 2017-10-20 RX ADMIN — FINASTERIDE SCH: 5 TABLET, FILM COATED ORAL at 10:51

## 2017-10-20 RX ADMIN — TIOTROPIUM BROMIDE SCH: 18 CAPSULE ORAL; RESPIRATORY (INHALATION) at 09:13

## 2017-10-20 RX ADMIN — SODIUM CHLORIDE SCH MLS: 900 INJECTION, SOLUTION INTRAVENOUS at 11:48

## 2017-10-20 RX ADMIN — VITAMIN D, TAB 1000IU (100/BT) SCH UNITS: 25 TAB at 11:48

## 2017-10-20 NOTE — ASMTCMCOM
CM Note

 

CM Note                       

Notes:

Pt in from [facility] where he has lived several months according to Daya at . Pt was 

admitted to  from [facility]. The following statements are reports from Daya at : pt is a 

[place]  with a dghtr (lives in [place]) very involved in his care. Dghtr is overwhelmed w pt 


care because pt often has staff call her when he is frustrated which can be often due to 

communication barriers w staff. While he is currently in an open unit he may need locked 

eventually. Pt does not want to live in a facility but is not safe to care for himself in the 

community and he is not able to live w dghtr. Pt can be resistant/combative w care, that is why he 

may look disheveled. 

Pt will d/c back to [facility] when medically stable. CM to follow. 

 

Date Signed:  10/20/2017 04:37 PM

Electronically Signed By:CARLIN Root

## 2017-10-20 NOTE — PDMN
Medical Necessity


Medical necessity: GRG urologic disease  -acute cystitis without hematuria  2 

days- urologic infection req inpt care with dehydration, weakness, 

deconditioning, encephalopathy in pt with hx of dementia, deafness, HTN, BPH, 

pt is resident of SNF

## 2017-10-21 RX ADMIN — PROPRANOLOL HYDROCHLORIDE SCH MG: 40 TABLET ORAL at 09:59

## 2017-10-21 RX ADMIN — VITAMIN D, TAB 1000IU (100/BT) SCH UNITS: 25 TAB at 09:59

## 2017-10-21 RX ADMIN — TIOTROPIUM BROMIDE SCH: 18 CAPSULE ORAL; RESPIRATORY (INHALATION) at 11:30

## 2017-10-21 RX ADMIN — SODIUM CHLORIDE SCH MLS: 900 INJECTION, SOLUTION INTRAVENOUS at 00:23

## 2017-10-21 RX ADMIN — FINASTERIDE SCH MG: 5 TABLET, FILM COATED ORAL at 09:59

## 2017-10-21 RX ADMIN — PROPRANOLOL HYDROCHLORIDE SCH MG: 40 TABLET ORAL at 20:29

## 2017-10-21 NOTE — HOSPPROG
Hospitalist Progress Note


Assessment/Plan: 


78 yo M admitted w falls





UTI, suspected - white count still quite elevated; UCx neg; 


   - for now, cont rocephin and follow clinically


   - consider other sources of infection if white count still elevated tomorrow

, or fever, etc


   - consider resistant bacteria also if WBC does not improve





leukocytosis - as above


   cxr w no infiltrate (interp by me)





acute on chronic encephalopathy - seems back to baseline today





deafness -  at bedside





htn - propranolol





BPH - urinating without clinical obstruction





dispo: pending; inpatient





Subjective: history taken through .  denies diarrhea


Objective: 


 Vital Signs











Temp Pulse Resp BP Pulse Ox


 


 37.1 C   54 L  16   176/87 H  90 L


 


 10/21/17 12:00  10/21/17 12:00  10/21/17 12:00  10/21/17 12:00  10/21/17 12:00








 Microbiology











 10/19/17 Unknown Urine Culture - Final





 Urine,Clean Catch 








 Laboratory Results





 10/20/17 08:36 





 10/20/17 08:36 





 











 10/20/17 10/21/17 10/22/17





 05:59 05:59 05:59


 


Intake Total 1010 1289 


 


Output Total 225  


 


Balance 785 1289 








 











PT  15.1 SEC (12.0-15.0)  H  10/19/17  17:00    


 


INR  1.19  (0.83-1.16)  H  10/19/17  17:00    














- Physical Exam


Constitutional: no apparent distress, appears nourished


Eyes: PERRL, anicteric sclera


Ears, Nose, Mouth, Throat: moist mucous membranes, hearing normal


Cardiovascular: regular rate and rhythym, no murmur, rub, or gallop


Respiratory: no respiratory distress, no rales or rhonchi


Gastrointestinal: normoactive bowel sounds, soft, non-tender abdomen, No 

guarding, No rebound


Genitourinary: No eddy in urethra


Skin: warm, normal color


Musculoskeletal: full muscle strength, no muscle tenderness


Neurologic: AAOx3





ICD10 Worksheet


Patient Problems: 


 Problems











Problem Status Onset


 


Pyelonephritis, acute Acute

## 2017-10-22 VITALS — TEMPERATURE: 98.4 F

## 2017-10-22 VITALS — SYSTOLIC BLOOD PRESSURE: 171 MMHG | HEART RATE: 78 BPM | DIASTOLIC BLOOD PRESSURE: 87 MMHG

## 2017-10-22 VITALS — OXYGEN SATURATION: 93 %

## 2017-10-22 LAB
ERYTHROCYTE [DISTWIDTH] IN BLOOD BY AUTOMATED COUNT: 14.2 % (ref 11.5–15.2)
HCT VFR BLD CALC: 39.4 % (ref 40–51)
HGB BLD-MCNC: 13.2 G/DL (ref 13.7–17.5)
LIPEMIA HEMOLYSIS FLAG: 80 (ref 0–99)
MCH RBC BLDCO QN: 31.1 PG (ref 27.9–34.1)
MCHC RBC AUTO-ENTMCNC: 33.5 G/DL (ref 32.4–36.7)
MCV RBC AUTO: 92.9 FL (ref 81.5–99.8)
PLATELET # BLD: 121 10^3/UL (ref 150–400)
PLATELET CLUMPS FLAG: 50 (ref 0–99)
RBC # BLD AUTO: 4.24 10^6/UL (ref 4.4–6.38)

## 2017-10-22 RX ADMIN — VITAMIN D, TAB 1000IU (100/BT) SCH UNITS: 25 TAB at 09:31

## 2017-10-22 RX ADMIN — PROPRANOLOL HYDROCHLORIDE SCH MG: 40 TABLET ORAL at 09:31

## 2017-10-22 RX ADMIN — TIOTROPIUM BROMIDE SCH: 18 CAPSULE ORAL; RESPIRATORY (INHALATION) at 08:23

## 2017-10-22 RX ADMIN — FINASTERIDE SCH MG: 5 TABLET, FILM COATED ORAL at 09:31

## 2017-10-22 NOTE — PDIAF
- Diagnosis


Diagnosis: viral illness


Code Status: Full Code





- Medication Management


Discharge Medications: 


 Medications to Continue on Transfer





Acetaminophen [Tylenol 325mg (*)] 650 mg PO Q6 PRN 10/19/17 [Last Taken Unknown]


Albuterol [Proventil Inhaler HFA (*)] 2 puffs IH Q4H PRN 10/19/17 [Last Taken 

Unknown]


Cholecalciferol Vit D3 [Vitamin D3 (*)] 1,000 units PO DAILY 10/19/17 [Last 

Taken Unknown]


Escitalopram Oxalate [Lexapro] 10 mg PO DAILY 10/19/17 [Last Taken Unknown]


Finasteride [Proscar 5 MG (*)] 5 mg PO DAILY 10/19/17 [Last Taken Unknown]


Propranolol HCl 80 mg PO BID 10/19/17 [Last Taken Unknown]


SUMAtriptan [Imitrex 50 MG (*)] 50 mg PO DAILY PRN 10/19/17 [Last Taken Unknown]


Sennosides [Senokot] 1 each PO BID PRN 10/19/17 [Last Taken Unknown]


Tiotropium Inhaler [Spiriva Inhaler (RX)] 1 inh IH DAILY 10/19/17 [Last Taken 

Unknown]


clonazePAM [Klonopin (*)] 0.5 mg PO HS 10/19/17 [Last Taken Unknown]


clonazePAM [Klonopin (*)] 0.5 mg PO Q8 PRN 10/19/17 [Last Taken Unknown]


traZODone [traZODONE 50MG (*)] 50 mg PO HS 10/19/17 [Last Taken Unknown]








Discharge Medications: Refer to the Discharge Home Medication list for PRN 

reason.





- Orders


Services needed: Registered Nurse, Physical Therapy, Occupational Therapy





- Follow Up Care


Current Providers and Referrals: 


Patient,NotPresent [Unknown] - As per Instructions

## 2017-10-22 NOTE — ASMTCMCOM
CM Note

 

CM Note                       

Notes:

Patient has been discharged and will return to [facility]. [facility] has been contacted and 

they will set up a W/C transport for 3:30 with [facility]. Daughter has been contacted about 

discharge.

 

Date Signed:  10/22/2017 03:00 PM

Electronically Signed By:Kassidy Brumfield LCSW

## 2017-10-22 NOTE — GDS
[f rep st]



                                                             DISCHARGE SUMMARY





DISCHARGE DIAGNOSES:  

1.  Falls. 

2.  Likely viral syndrome.  

3.  Leukocytosis.



HOSPITAL COURSE:  Please see admission history and physical.  The patient presented on the 19th with 
some falls in a few days.  He was noted to have a leukocytosis.  He had a workup.  This showed leukoc
ytosis and a mild degree of pyuria.  Blood cultures were negative.  Chest x-ray, no evidence of infil
trate.  Soft abdominal exam.  He is eating and drinking well.  He did not have diarrhea.  He does hav
e nausea.  He is not febrile and did not have an oxygen requirement. 



The patient received a couple days of ceftriaxone for suspected UTI.  His urine culture grew out noth
ing.  I have discontinued his antibiotics as it is not a UTI. 



He is discharged home off antibiotics on his home medication regimen with physical and occupational t
herapy at [facility] where he resides.





Job #:  [id number]

## 2017-10-22 NOTE — HOSPPROG
Hospitalist Progress Note


Assessment/Plan: 


76 yo M admitted w falls





UTI, suspected - white count still quite elevated; UCx neg; 


   - dc abx


   not a uti


   





leukocytosis - as above


   cxr w no infiltrate (interp by me)





acute on chronic encephalopathy - seems back to baseline today





deafness -  at bedside





htn - propranolol





BPH - urinating without clinical obstruction





dispo: home today


   > 30 minutes 


   see dc summary


Subjective: afebrile.  ucx neg


Objective: 


 Vital Signs











Temp Pulse Resp BP Pulse Ox


 


 36.9 C   78   16   171/87 H  93 


 


 10/22/17 04:00  10/22/17 12:00  10/22/17 12:00  10/22/17 12:00  10/22/17 12:00








 Microbiology











 10/19/17 Unknown Urine Culture - Final





 Urine,Clean Catch 








 Laboratory Results





 10/22/17 09:54 





 10/20/17 08:36 





 











 10/21/17 10/22/17 10/23/17





 05:59 05:59 05:59


 


Intake Total 1289 350 550


 


Output Total  300 


 


Balance 1289 50 550








 











PT  15.1 SEC (12.0-15.0)  H  10/19/17  17:00    


 


INR  1.19  (0.83-1.16)  H  10/19/17  17:00    














- Physical Exam


Constitutional: no apparent distress, appears nourished


Eyes: PERRL, anicteric sclera


Ears, Nose, Mouth, Throat: moist mucous membranes, hearing normal


Cardiovascular: regular rate and rhythym, no murmur, rub, or gallop


Respiratory: no respiratory distress, no rales or rhonchi


Gastrointestinal: normoactive bowel sounds, soft, non-tender abdomen


Genitourinary: no bladder fullness, No eddy in urethra


Skin: warm


Musculoskeletal: full muscle strength


Neurologic: AAOx3





ICD10 Worksheet


Patient Problems: 


 Problems











Problem Status Onset


 


Pyelonephritis, acute Acute

## 2017-10-22 NOTE — ASDISCHSUM
----------------------------------------------

Discharge Information

----------------------------------------------

Plan Status:SNF                                      Medically Cleared to Leave:10/22/2017

Discharge Date:10/22/2017 03:21 PM                   CM D/C Disposition:Skilled Nursing Facility

ADT D/C Disposition:Skilled Nursing Facility         Projected Discharge Date:10/22/2017 04:00 PM

Transportation at D/C:Wheelchair Van                 Discharge Delay Reason:

Follow-Up Date:10/22/2017 04:00 PM                   Discharge Slot:2 - 12:01 pm - 18:00 pm

Final Diagnosis:Falls, weakness, viral illness

----------------------------------------------

Placement Information

----------------------------------------------

Referral Type:*Nursing Home/SNF                      Referral ID:[id number]

Provider Name:Antonino Livingston/[facility]

Address 1:[address]                         Phone Number:

Address 2:                                           Fax Number:

City:[place]                                         Selection Factors:

State:[place]

 

----------------------------------------------

Patient Contact Information

----------------------------------------------

Contact Name:JR                        Relationship:Daughter

Address:                                             Home [phone]

                                                     Work [phone]

City:                                                [facility] Phone:

[facility]/Zip Code:                                      Email:

----------------------------------------------

Financial Information

----------------------------------------------

Financial Class:Medicare Advantage Plans

Primary Plan Desc:[employer] ADV IP             Primary Plan Number:[id number]

Secondary Plan Desc:                                 Secondary Plan Number:

 

 

----------------------------------------------

Assessment Information

----------------------------------------------

----------------------------------------------

LACE

----------------------------------------------

LACE

 

Length of stay for            Answers:  Less than 1 day                       

current admission                                                             

Acuity / Level of Care        Answers:  Was the patient admitted              

                                        to hospital via the                   

                                        emergency                             

                                        department? Yes:                      

Comorbidities - select        Answers:  Dementia                              

all that apply                                                                

Emergency dept visits in      Answers:  4+                                    

last 6 months                                                                 

Score: 10

 

Date Signed:  10/19/2017 07:14 PM

Electronically Signed By:Landy Sumner RN

 

 

----------------------------------------------

[facility] Progress Note

----------------------------------------------

CM Note

 

CM Note                       

Notes:

Pt in from [facility] where he has lived several months according to Daya at . Pt was 

admitted to  from [facility]. The following statements are reports from Daya at : pt is a 

[place]  with a dghtr (lives in [place]) very involved in his care. Dghtr is overwhelmed w pt 


care because pt often has staff call her when he is frustrated which can be often due to 

communication barriers w staff. While he is currently in an open unit he may need locked 

eventually. Pt does not want to live in a facility but is not safe to care for himself in the 

community and he is not able to live w dghtr. Pt can be resistant/combative w care, that is why he 

may look disheveled. 

Pt will d/c back to [facility] when medically stable. CM to follow. 

 

Date Signed:  10/20/2017 04:37 PM

Electronically Signed By:CARLIN Root

 

 

----------------------------------------------

[facility] CM Progress Note

----------------------------------------------

CM Note

 

CM Note                       

Notes:

Patient has been discharged and will return to [facility]. [facility] has been contacted and 

they will set up a W/C transport for 3:30 with [facility]. Daughter has been contacted about 

discharge.

 

Date Signed:  10/22/2017 03:00 PM

Electronically Signed By:Kassidy Brumfield LCSW

 

 

----------------------------------------------

Intervention Information

----------------------------------------------

## 2017-11-15 ENCOUNTER — HOSPITAL ENCOUNTER (INPATIENT)
Dept: HOSPITAL 80 - FED | Age: 77
LOS: 2 days | Discharge: NURSING, REHAB OR LONG-TERM CARE | DRG: 682 | End: 2017-11-17
Attending: INTERNAL MEDICINE | Admitting: INTERNAL MEDICINE
Payer: COMMERCIAL

## 2017-11-15 DIAGNOSIS — I10: ICD-10-CM

## 2017-11-15 DIAGNOSIS — G93.40: ICD-10-CM
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
## 2017-11-15 DIAGNOSIS — E87.0: ICD-10-CM

## 2017-11-15 DIAGNOSIS — G30.9: ICD-10-CM

## 2017-11-15 DIAGNOSIS — H91.93: ICD-10-CM

## 2017-11-15 DIAGNOSIS — N40.0: ICD-10-CM

## 2017-11-15 DIAGNOSIS — N17.9: Primary | ICD-10-CM

## 2017-11-15 DIAGNOSIS — R29.6: ICD-10-CM

## 2017-11-15 DIAGNOSIS — J44.9: ICD-10-CM
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
## 2017-11-15 DIAGNOSIS — K21.9: ICD-10-CM

## 2017-11-15 DIAGNOSIS — S70.11XA: ICD-10-CM

## 2017-11-15 DIAGNOSIS — F02.80: ICD-10-CM

## 2017-11-15 LAB
% IMMATURE GRANULYOCYTES: 0.5 % (ref 0–1.1)
ABSOLUTE IMMATURE GRANULOCYTES: 0.07 10^3/UL (ref 0–0.1)
ABSOLUTE NRBC COUNT: 0 10^3/UL (ref 0–0.01)
ADD DIFF?: NO
ADD MORPH?: NO
ADD SCAN?: NO
ANION GAP SERPL CALC-SCNC: 16 MEQ/L (ref 8–16)
ANION GAP SERPL CALC-SCNC: 8 MEQ/L (ref 8–16)
APTT BLD: 30.9 SEC (ref 23–38)
ATYPICAL LYMPHOCYTE FLAG: 0 (ref 0–99)
BILIRUB SERPL-MCNC: 1.1 MG/DL (ref 0.1–1.4)
CALCIUM SERPL-MCNC: 8.4 MG/DL (ref 8.5–10.4)
CALCIUM SERPL-MCNC: 9.1 MG/DL (ref 8.5–10.4)
CHLORIDE SERPL-SCNC: 104 MEQ/L (ref 97–110)
CHLORIDE SERPL-SCNC: 111 MEQ/L (ref 97–110)
CO2 SERPL-SCNC: 26 MEQ/L (ref 22–31)
CO2 SERPL-SCNC: 27 MEQ/L (ref 22–31)
COLOR UR: YELLOW
CREAT SERPL-MCNC: 1.6 MG/DL (ref 0.7–1.3)
CREAT SERPL-MCNC: 2.1 MG/DL (ref 0.7–1.3)
ERYTHROCYTE [DISTWIDTH] IN BLOOD BY AUTOMATED COUNT: 14.4 % (ref 11.5–15.2)
FRAGMENT RBC FLAG: 0 (ref 0–99)
GFR SERPL CREATININE-BSD FRML MDRD: 31 ML/MIN/{1.73_M2}
GFR SERPL CREATININE-BSD FRML MDRD: 42 ML/MIN/{1.73_M2}
GLUCOSE SERPL-MCNC: 115 MG/DL (ref 70–100)
GLUCOSE SERPL-MCNC: 115 MG/DL (ref 70–100)
HCT VFR BLD CALC: 31.7 % (ref 40–51)
HGB BLD-MCNC: 11 G/DL (ref 13.7–17.5)
HYALINE CASTS #/AREA URNS LPF: (no result) /LPF (ref 0–1)
INR PPP: 1.26 (ref 0.83–1.16)
LEFT SHIFT FLG: 0 (ref 0–99)
LIPEMIA HEMOLYSIS FLAG: 90 (ref 0–99)
MCH RBC BLDCO QN: 32.1 PG (ref 27.9–34.1)
MCHC RBC AUTO-ENTMCNC: 34.7 G/DL (ref 32.4–36.7)
MCV RBC AUTO: 92.4 FL (ref 81.5–99.8)
MUCOUS THREADS #/AREA URNS LPF: (no result) /LPF
NITRITE UR QL STRIP: NEGATIVE
NRBC-AUTO%: 0 % (ref 0–0.2)
PH UR STRIP: 5 [PH] (ref 5–7.5)
PLATELET # BLD: 179 10^3/UL (ref 150–400)
PLATELET CLUMPS FLAG: 0 (ref 0–99)
PMV BLD AUTO: 11.5 FL (ref 8.7–11.7)
POTASSIUM SERPL-SCNC: 3.6 MEQ/L (ref 3.5–5.2)
POTASSIUM SERPL-SCNC: 3.8 MEQ/L (ref 3.5–5.2)
PROTHROMBIN TIME: 15.8 SEC (ref 12–15)
RBC # BLD AUTO: 3.43 10^6/UL (ref 4.4–6.38)
RBC #/AREA URNS HPF: (no result) /HPF (ref 0–3)
SODIUM SERPL-SCNC: 146 MEQ/L (ref 134–144)
SODIUM SERPL-SCNC: 146 MEQ/L (ref 134–144)
SP GR UR STRIP: 1.02 (ref 1–1.03)
WBC #/AREA URNS HPF: (no result) /HPF (ref 0–3)

## 2017-11-15 PROCEDURE — G0378 HOSPITAL OBSERVATION PER HR: HCPCS

## 2017-11-15 NOTE — EDPHY
H & P


Stated Complaint: AMS x2 weeks from [facility], recent falls





- Medical/Surgical History


Hx Asthma: No


Hx Chronic Respiratory Disease: No


Hx Diabetes: No


Hx Cardiac Disease: No


Hx Renal Disease: No


Hx Cirrhosis: No


Hx Alcoholism: No


Hx HIV/AIDS: No


Hx Splenectomy or Spleen Trauma: No


Other PMH: HTN, early dementia and pt is deaf, BPH,COPD,GERD,





- Social History


Smoking Status: Heavy smoker


Time Seen by Provider: 11/15/17 12:59


Constitutional: 


 Initial Vital Signs











Temperature (C)  37 C   11/15/17 12:28


 


Heart Rate  51 L  11/15/17 12:28


 


Respiratory Rate  18   11/15/17 12:28


 


Blood Pressure  93/56 L  11/15/17 12:28


 


O2 Sat (%)  93   11/15/17 12:28








 











O2 Delivery Mode               Room Air


 


O2 (L/minute)                  2














Allergies/Adverse Reactions: 


 





iodine Allergy (Verified 10/19/17 17:47)


 








Home Medications: 














 Medication  Instructions  Recorded


 


Acetaminophen [Tylenol 325mg (*)] 650 mg PO Q6 PRN 10/19/17


 


Albuterol [Proventil Inhaler HFA 2 puffs IH Q4H PRN 10/19/17





(*)]  


 


Cholecalciferol Vit D3 [Vitamin D3 1,000 units PO DAILY 10/19/17





(*)]  


 


Escitalopram Oxalate [Lexapro 10 10 mg PO DAILY 10/19/17





MG]  


 


Finasteride [Proscar 5 MG (*)] 5 mg PO DAILY 10/19/17


 


Propranolol HCl 80 mg PO BID 10/19/17


 


SUMAtriptan [Imitrex 50 MG (*)] 50 mg PO DAILY PRN 10/19/17


 


Sennosides [Senokot] 1 each PO BID PRN 10/19/17


 


Tiotropium Inhaler [Spiriva 1 inh IH DAILY 10/19/17





Inhaler (RX)]  


 


clonazePAM [Klonopin (*)] 0.5 mg PO Q8 PRN 10/19/17


 


Acetaminophen [Tylenol 325mg (*)] 650 mg PO Q4HRS PRN  tab 11/17/17


 


Melatonin [Melatonin 3 MG (*)] 3 mg PO HS #30 tab 11/17/17














Medical Decision Making


ED Course/Re-evaluation: 





CHIEF COMPLAINT:  Altered mental status





HISTORY OF PRESENT ILLNESS:  This is a 77-year-old gentleman who lives at a 

nursing facility.  He is deaf.  He reportedly is having some altered mental 

status for 2 weeks.  We have been trying to obtain a  

however the service cannot provide us an  until 4:30 p.m..  The 

patient is non verbal completely.  It is very difficult for me to ascertain any 

information.  I am simply going by the nursing home information at this time.





REVIEW OF SYSTEMS:  





Unable to obtain due the patient's nonverbal status.   

is on the way





PHYSICAL EXAM:  





HR, BP, O2 Sat, RR.  Temp noted


General Appearance:  Alert, well hydrated, appropriate, and non-toxic appearing.


Head:  Atraumatic without scalp tenderness or obvious injury


Eyes:  Pupils equal, round, reactive to light and accommodation, EOMI, no trauma

, no injection.


Ears:  Clear bilaterally, no perforation, normal landmarks


Nose:  Atraumatic, no rhinorrhea, clear.


Throat:  There is no erythema or exudates, no lesions, normal tonsils, mucus 

membranes moist.


Neck:  Supple, 2+ carotid upstroke, nontender, no lymphadenopathy.


Respiratory:  No retractions, no distress, no wheezes, and no accessory muscle 

use.  Lungs are clear to auscultation bilaterally.


Cardiovascular:  Regular rate and rhythm, no murmurs, rubs, or gallops. 

Bilateral carotid, radial, dorsalis pedis, and posterior tibial pulses intact. 

Good capillary refill all extremities.


Gastrointestinal:  Abdomen is soft, nontender, non-distended, no masses, no 

rebound, no guarding, no peritoneal signs.


Musculoskeletal:  Normal active ROM of all extremities, atraumatic.


Neurological:  Alert, appropriate, and interactive.  The patient has normal 

DTRs and non-focal cranial nerves, motor, sensory, and cerebellar exam.


Skin:  No rashes, good turgor, no nodules on palpation.





Past medical history:  Unable to obtain


Past surgical history:  Unable to obtain


Family history:  Unable to obtain


Social history:  Lives at a nursing facility, does not use tobacco drugs or 

alcohol, single, not employed





DIAGNOSTICS/PROCEDURES/CRITICAL CARE TIME:  


Study:  PA and Lateral Chest X-ray





Indication:  altered mental status


Results:  After viewing the images myself on the PACS system.  My 

interpretation of the images is:  Pending





DIFFERENTIAL DIAGNOSIS:   The differential diagnosis for the patient's altered 

mental status included but was not limited to hypoglycemia, infectious process, 

electrolyte abnormality, head injury, neurologic process, anemia, cardiac 

process, and intoxicants.





MEDICAL DECISION MAKING:  I performed laboratory studies on this patient and a 

urinalysis and a chest x-ray.  I am waiting for the urinalysis and check says 

her to come back.  This patient does have an elevated white blood cell count 

with a left shift.  He also has some prerenal dehydration with a significantly 

elevated BUN and creatinine a little over 2.  I am hydrating the patient to see 

if that improves his mental status.  I will sign this patient out to Dr. Frazier at change of shift awaiting the  to further ascertain 

whether not this patient is at baseline or feeling better or what his symptoms 

actually are. (Cliff Hartmann)





1626:  I did take over the care of this patient from Dr. Hartmann.  We are 

waiting on a .  We did touch base with [facility] matter 

they report that he appears more confused than his baseline and additionally he 

has been falling recently.  Due to the falls I did elect to scan his head he 

did have a CT scan head without contrast that does not show anything acute 

specifically no subdural epidural.





1832:  I did go and re-evaluate the patient at this time the sign language 

 was used to converse with this patient.  In conversing with the 

patient with  the patient denies having any 

complaints.  He denies chest pain or shortness of breath denies headache.  He 

does tell me sleepy.  He tells me feels fine.





I did review his blood work.  He does have an elevated BUN and creatinine.  

This is different from his baseline he did receive 2 L of fluid here in the 

emergency room.  We will ambulate him to see how well he does walking.  He is 

unable to walk in 2 week I have a low threshold to admit this gentleman as he 

77 years old elevated BUN and creatinine he does appear dry on exam.  And he is 

deaf.











1857:  This patient did ambulate with a walker but needed assistance.  His 

right posterior thigh is very ecchymotic.  Will perform an x-ray of his right 

femur.  Right knee.  Additionally given his unsteady gait need for help with a 

walker, language barrier, dehydration, acute kidney injury he will be admitted 

to the hospitalist service for rehydration PT OT.








Of note there was a delay in this patient's ER visit as he spent a large amount 

of time in the emergency room as we are waiting a  to 

help communicate with him.  This took an extensive amount of time to get the 

 over 3 hours into the emergency room.





EKG interpretation by me on record in [employer] system.  Impression time of 

EKG 1653, sinus rhythm rate of 53.  Pr interval noted to be 216 first-degree AV 

block.  No acute ischemia.  Sinus Jayce.








2035:  Spoke with Dr. lea hospitalist service agrees to admit this patient.  

Reason for admission dehydration, acute kidney injury, unstable gait.  

Generalized weakness. (Jamel Frazier)





- Data Points


Laboratory Results: 


 Laboratory Results





 11/16/17 05:02 





 11/16/17 05:02 








Medications Given: 


 








Discontinued Medications





Acetaminophen (Tylenol)  650 mg PO Q4HRS PRN


   PRN Reason: Pain, Mild/Fever, Can Take PO


   Stop: 05/14/18 22:16


   Last Admin: 11/16/17 17:42 Dose:  650 mg


Cholecalciferol (Vitamin D)  1,000 units PO DAILY [facility]


   Stop: 05/16/18 08:59


   Last Admin: 11/17/17 10:47 Dose:  1,000 units


Clonazepam (Klonopin)  0.5 mg PO Q8 PRN


   PRN Reason: Anxiety


   Stop: 05/15/18 10:18


   Last Admin: 11/16/17 17:42 Dose:  0.5 mg


Escitalopram Oxalate (Lexapro)  10 mg PO DAILY [facility]


   Stop: 05/16/18 08:59


   Last Admin: 11/17/17 10:48 Dose:  10 mg


Fentanyl (Sublimaze)  25 mcg IVP EDNOW ONE


   Stop: 11/15/17 16:06


   Last Admin: 11/15/17 16:06 Dose:  25 mcg


Fentanyl (Sublimaze)  25 mcg IVP EDNOW ONE


   Stop: 11/15/17 20:22


   Last Admin: 11/15/17 20:22 Dose:  25 mcg


Finasteride (Proscar)  5 mg PO DAILY [facility]


   Stop: 05/16/18 08:59


   Last Admin: 11/17/17 10:48 Dose:  5 mg


Sodium Chloride (Ns)  1,000 mls @ 0 mls/hr IV EDNOW ONE; Wide Open


   PRN Reason: Protocol


   Stop: 11/15/17 14:17


   Last Admin: 11/15/17 14:29 Dose:  1,000 mls


Sodium Chloride (Ns)  1,000 mls @ 0 mls/hr IV EDNOW ONE; Wide Open


   PRN Reason: Protocol


   Stop: 11/15/17 14:17


   Last Admin: 11/15/17 14:30 Dose:  1,000 mls


Propranolol HCl (Inderal)  80 mg PO BID KENN


   Stop: 05/15/18 09:14


   Last Admin: 11/17/17 10:43 Dose:  80 mg


Tiotropium Bromide (Spiriva Handihaler)  18 mcg IH DAILY [facility]


   Stop: 05/16/18 08:59


   Last Admin: 11/17/17 09:56 Dose:  1 puffs








Departure





- Departure


Disposition: [facility]


Clinical Impression: 


 TUSHAR (acute kidney injury), Generalized weakness





Fall


Qualifiers:


 Encounter type: initial encounter Qualified Code(s): W19.XXXA - Unspecified 

fall, initial encounter





Condition: Fair

## 2017-11-15 NOTE — CPEKG
Heart Rate: 53

RR Interval: 1132

P-R Interval: 216

QRSD Interval: 102

QT Interval: 524

QTC Interval: 493

P Axis: 62

QRS Axis: 5

T Wave Axis: 64

EKG Severity - BORDERLINE ECG -

EKG Impression: SINUS RHYTHM

EKG Impression: ATRIAL PREMATURE COMPLEX

EKG Impression: BORDERLINE PROLONGED QT INTERVAL

Electronically Signed By: Jamel Frazier 15-Nov-2017 23:29:03
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
## 2017-11-16 LAB
% IMMATURE GRANULYOCYTES: 0.4 % (ref 0–1.1)
ABSOLUTE IMMATURE GRANULOCYTES: 0.04 10^3/UL (ref 0–0.1)
ABSOLUTE NRBC COUNT: 0 10^3/UL (ref 0–0.01)
ADD DIFF?: NO
ADD MORPH?: NO
ADD SCAN?: NO
ALBUMIN SERPL-MCNC: 2.8 G/DL (ref 3.5–5)
ALP SERPL-CCNC: 55 IU/L (ref 38–126)
ALT SERPL-CCNC: 29 IU/L (ref 21–72)
ANION GAP SERPL CALC-SCNC: 10 MEQ/L (ref 8–16)
APTT BLD: 32.7 SEC (ref 23–38)
AST SERPL-CCNC: 17 IU/L (ref 17–59)
ATYPICAL LYMPHOCYTE FLAG: 0 (ref 0–99)
BILIRUB SERPL-MCNC: 0.9 MG/DL (ref 0.1–1.4)
CALCIUM SERPL-MCNC: 8.6 MG/DL (ref 8.5–10.4)
CHLORIDE SERPL-SCNC: 114 MEQ/L (ref 97–110)
CO2 SERPL-SCNC: 25 MEQ/L (ref 22–31)
CREAT SERPL-MCNC: 1.4 MG/DL (ref 0.7–1.3)
ERYTHROCYTE [DISTWIDTH] IN BLOOD BY AUTOMATED COUNT: 14.5 % (ref 11.5–15.2)
FRAGMENT RBC FLAG: 0 (ref 0–99)
GFR SERPL CREATININE-BSD FRML MDRD: 49 ML/MIN/{1.73_M2}
GLUCOSE SERPL-MCNC: 91 MG/DL (ref 70–100)
HCT VFR BLD CALC: 28.5 % (ref 40–51)
HGB BLD-MCNC: 9.3 G/DL (ref 13.7–17.5)
INR PPP: 1.33 (ref 0.83–1.16)
LEFT SHIFT FLG: 0 (ref 0–99)
LIPEMIA HEMOLYSIS FLAG: 80 (ref 0–99)
MCH RBC BLDCO QN: 30.6 PG (ref 27.9–34.1)
MCHC RBC AUTO-ENTMCNC: 32.6 G/DL (ref 32.4–36.7)
MCV RBC AUTO: 93.8 FL (ref 81.5–99.8)
NRBC-AUTO%: 0 % (ref 0–0.2)
PLATELET # BLD: 124 10^3/UL (ref 150–400)
PLATELET CLUMPS FLAG: 0 (ref 0–99)
PMV BLD AUTO: 11.1 FL (ref 8.7–11.7)
POTASSIUM SERPL-SCNC: 3.6 MEQ/L (ref 3.5–5.2)
PROT SERPL-MCNC: 4.6 G/DL (ref 6.3–8.2)
PROTHROMBIN TIME: 16.5 SEC (ref 12–15)
RBC # BLD AUTO: 3.04 10^6/UL (ref 4.4–6.38)
SODIUM SERPL-SCNC: 149 MEQ/L (ref 134–144)

## 2017-11-16 RX ADMIN — ACETAMINOPHEN PRN MG: 325 TABLET ORAL at 17:42

## 2017-11-16 RX ADMIN — PROPRANOLOL HYDROCHLORIDE SCH: 40 TABLET ORAL at 10:17

## 2017-11-16 RX ADMIN — PROPRANOLOL HYDROCHLORIDE SCH MG: 40 TABLET ORAL at 21:41

## 2017-11-16 RX ADMIN — ACETAMINOPHEN PRN MG: 325 TABLET ORAL at 14:07

## 2017-11-16 NOTE — PDMN
Medical Necessity


Medical necessity: Patient meets INPT criteria per NP note and [facility] 

GRG (presents with confusion, general weakness and reported falls the past few 

days; new altered mental status and acute encephalopathy with hx of dementia; 

TUSHAR: Creat 2.1->1.4; hypernatremia worsened, Na 146->149; awaiting neuro consult

/assistance w/mgmt. of pt's agitation; ongoing PT/OT for gait instabilitiy w/

freq falls; LOS will be > 2 midnights.)

## 2017-11-16 NOTE — HOSPPROG
Hospitalist Progress Note


Assessment/Plan: 





Patient is a 68-year-old male who was recently hospitalized in October for 

generalized weakness.  He presented from [facility] with confusion, general 

weakness and reported falls in the last few days.  Today is my 1st encounter 

with the patient.  Chart reviewed.





* altered mental status, acute encephalopathy


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


-in addition he is on trazodone and Klonopin which could be affecting him


-will hold medications that cause sedation and see if he clears


-CT of the head shows no acute findings


-chest x-ray does not indicate any infectious etiology





* Alzheimer dementia


-spoke with patient's daughter, Brittney. She says her father's baseline is 

agitation and difficulty with walking/ does best in a wheelchair


-they have had increase difficulty managing him at /have asked neurology to 

see to help manage his agitation


-he did very well today with the , but he is very confused.  He does 

not know where he is.  He does not know where he lives regularly.  He knows 

that he has a daughter





* acute kidney injury


-creatinine improved after getting hydration.  Was 2.1 and now is 1.4





* hypernatremia


-bit worse today.  Patient no longer has an IV in him.  Will recheck his labs 

in the morning to make sure he is adequately hydrated





* hematoma noted in the right upper leg area





* gait instability with frequent falls


-patient is impulsive at times


-will ask Physical therapy and Occupational therapy for further input


-spoke with patient's daughter.  He has been requesting to use a wheelchair 

because his legs are so weak


-femur x-ray shows nothing acute





* BPH


-on Proscar





* hearing loss


-patient could only comprehend via an  from sign language


-when I evaluated him he does not appear that he reads lips





* COPD


-oxygen level stable on room air





* plan.  Spoke with Neurology and they will see him tomorrow.  I am asking the 

neurologist to help with managing his agitation.  I think this is 

multifactorial in that he has difficulty with communicating and has significant 

dementia. Will get repeat labs in a.m. will need another midnight stay to have 

a plan to help support the patient when he returns to . This will make him 

IP.  Will continue the Klonopin p.r.n. during the day but would discontinue the 

trazodone at night. Patient's daughter is Brittney, her # is .





Subjective: Emiliano says nothing hurts/ met w him via the 'sign '


Objective: 


 Vital Signs











Temp Pulse Resp BP Pulse Ox


 


 36.4 C   47 L  15   151/83 H  95 


 


 11/16/17 03:52  11/16/17 03:52  11/16/17 03:52  11/16/17 03:52  11/16/17 03:52








 Laboratory Results





 11/16/17 05:02 





 11/16/17 05:02 





 











 11/15/17 11/16/17 11/17/17





 05:59 05:59 05:59


 


Intake Total  150 


 


Balance  150 








 











PT  16.5 SEC (12.0-15.0)  H  11/16/17  05:02    


 


INR  1.33  (0.83-1.16)  H  11/16/17  05:02    














- Physical Exam


Constitutional: chronically ill appearing, other (thin)


Eyes: PERRL


Ears, Nose, Mouth, Throat: dry mucous membranes, other (deaf)


Respiratory: no respiratory distress


Gastrointestinal: normoactive bowel sounds


Skin: warm


Musculoskeletal: generalized weakness


Neurologic: other (alert and oriented to himself only)


Psychiatric: poor insight, poor judgement, poor memory





ICD10 Worksheet


Patient Problems: 


 Problems











Problem Status Onset


 


TUSHAR (acute kidney injury) Acute  


 


Fall Acute  


 


Generalized weakness Acute  


 


Pyelonephritis, acute Acute

## 2017-11-16 NOTE — ASMTCMCOM
CM Note

 

CM Note                       

Notes:

Spoke w/RN, pt lives at [facility], anticipate will return there when medically stable, PT/OT 

evals pending. When pt goes back may benefit from restorative therapies through [facility]. CM w/f.



Current DC Plan: Back to [facility]/LTC

 

Date Signed:  11/16/2017 12:04 PM

Electronically Signed By:Ivelisse Thomas RN

## 2017-11-17 VITALS — TEMPERATURE: 98.2 F | HEART RATE: 61 BPM | RESPIRATION RATE: 18 BRPM | OXYGEN SATURATION: 93 %

## 2017-11-17 VITALS — DIASTOLIC BLOOD PRESSURE: 72 MMHG | SYSTOLIC BLOOD PRESSURE: 163 MMHG

## 2017-11-17 LAB
% IMMATURE GRANULYOCYTES: 0.4 % (ref 0–1.1)
ABSOLUTE IMMATURE GRANULOCYTES: 0.04 10^3/UL (ref 0–0.1)
ABSOLUTE NRBC COUNT: 0 10^3/UL (ref 0–0.01)
ADD DIFF?: NO
ADD MORPH?: NO
ADD SCAN?: NO
ANION GAP SERPL CALC-SCNC: 9 MEQ/L (ref 8–16)
ATYPICAL LYMPHOCYTE FLAG: 0 (ref 0–99)
CALCIUM SERPL-MCNC: 8.8 MG/DL (ref 8.5–10.4)
CHLORIDE SERPL-SCNC: 110 MEQ/L (ref 97–110)
CO2 SERPL-SCNC: 26 MEQ/L (ref 22–31)
CREAT SERPL-MCNC: 1 MG/DL (ref 0.7–1.3)
ERYTHROCYTE [DISTWIDTH] IN BLOOD BY AUTOMATED COUNT: 14.6 % (ref 11.5–15.2)
FRAGMENT RBC FLAG: 0 (ref 0–99)
GFR SERPL CREATININE-BSD FRML MDRD: > 60 ML/MIN/{1.73_M2}
GLUCOSE SERPL-MCNC: 93 MG/DL (ref 70–100)
HCT VFR BLD CALC: 32.3 % (ref 40–51)
HGB BLD-MCNC: 10.9 G/DL (ref 13.7–17.5)
LEFT SHIFT FLG: 0 (ref 0–99)
LIPEMIA HEMOLYSIS FLAG: 80 (ref 0–99)
MCH RBC BLDCO QN: 31 PG (ref 27.9–34.1)
MCHC RBC AUTO-ENTMCNC: 33.7 G/DL (ref 32.4–36.7)
MCV RBC AUTO: 91.8 FL (ref 81.5–99.8)
NRBC-AUTO%: 0 % (ref 0–0.2)
PLATELET # BLD: 171 10^3/UL (ref 150–400)
PLATELET CLUMPS FLAG: 0 (ref 0–99)
PMV BLD AUTO: 11 FL (ref 8.7–11.7)
POTASSIUM SERPL-SCNC: 3.9 MEQ/L (ref 3.5–5.2)
RBC # BLD AUTO: 3.52 10^6/UL (ref 4.4–6.38)
SODIUM SERPL-SCNC: 145 MEQ/L (ref 134–144)

## 2017-11-17 RX ADMIN — PROPRANOLOL HYDROCHLORIDE SCH MG: 40 TABLET ORAL at 10:43

## 2017-11-17 NOTE — ASMTCMCOM
CM Note

 

CM Note                       

Notes:

Please disregard CM Discharge Plan Note. This pt discharged back to [facility] not 

[facility]. Transport set up by .

 

Date Signed:  11/17/2017 03:05 PM

Electronically Signed By:CARLIN Cao

## 2017-11-17 NOTE — NEUROPROG
Assessment: 








I have been asked by Sherry Tamayo, NP of [facility] to review Mr. Miner's case 

regarding some behavioral disturbances.


Mr. Miner is a 77 year old man with a reported history of Alzheimer dementia 

and acquired deafness.  He uses ASL as his primary means of communication.


He was admitted 11/15 to our facility after being transported from [facility] where he resides.  The history of his visit is obtained from the medical 

record, nursing/ancillary staff and both hearing and deaf ASL interpreters.  


He had reported increasing confusion and falls at [facility] prompting 

evaluation.  This seems to be a recurrent theme looking back at prior ED/

admission notes.  He was found to be dehydrated on labs and clinically, so 

rehydration measures have been instituted.  However, of main concern is his 

behavior at [facility].  He is quite impulsive and often does not accept 

help to exit his bed/chair for transfer assistance.  He is generally wheelchair 

bound.  He also tends to get in arguments with staff.  By analysis of our 

 and nursing colleagues, this seems to stem from a breakdown in 

communication with staff at [facility].  There is no certified deaf or 

hearing ASL interpreters present.  It seems some staff may know some basic signs

, but nothing one would consider fluency in ASL.  He seems to frequently get 

frustrated when trying to interact with staff, which results in him doing 

whatever he deems necessary to achieve whatever goal he working towards.  Here 

in our hospital, in the presence of our ASL interpreters, he has been calm and 

cooperative.  He did pull his IV out last night, but it is not clear if an 

 was present.  He has not been combative or violent during his 

hospital stay here.


Currently he is resting in bed.  He is cachectic appearing.  I was able to see 

him ambulate with 2 person assist earlier, and he certainly needs this support.





I think the main issue regarding Mr. Miner's behavior is a breakdown in 

communication with the staff at [facility].  He seems to have no significant 

issues here in the presence of fluent ASL interpreters.  





I do agree with the discontinuation of clonazepam and trazodone given his 

history of dementia, as he may have a paradoxical response to these agents.  

For sleep, melatonin 3mg nightly may be a better option to assist with sleep.  

This can be escalated over time to upwards of 12mg.  For any true behavioral 

issue, first line interventions would include behavioral modification including 

reassurance, distraction and redirection.  For violent/disruptive events, a low 

dose of quetiapine, 12.5-25mg PO daily PRN severe agitation would be a better 

option from a medication perspective (he has a borderline QTc prolongation on 

his EKG, but I don't think this low dose poses a risk of cardiac dysrhythmia).  

For severe, violent outbursts where he would be unable to take PO, low-dose 

olanzapine IM would be of similar effectiveness.





Overall, it may be best to reassess his living situation with case management 

and if possible find a living environment with appropriate resources for the 

deaf, such as staff that are truly fluent in ASL.





Case was reviewed with staff/providers at bedside.  





50 mins in direct patient care activities on the floor.





Objective: 





 Vital Signs











Temp Pulse Resp BP Pulse Ox


 


 36.8 C   61   18   163/72 H  93 


 


 11/17/17 10:57  11/17/17 10:57  11/17/17 10:57  11/17/17 10:57  11/17/17 10:57








 Laboratory Results





 11/17/17 09:35 





 11/17/17 09:35 





 











PT  16.5 SEC (12.0-15.0)  H  11/16/17  05:02    


 


INR  1.33  (0.83-1.16)  H  11/16/17  05:02    











Allergies/Adverse Reactions: 


 





iodine Allergy (Verified 10/19/17 17:47)

## 2017-11-17 NOTE — GDS
[f 
rep st]



                                                             DISCHARGE SUMMARY





DISCHARGE DIAGNOSES:  

1.  Acute encephalopathy, likely on chronic encephalopathy.

2.  Alzheimer dementia.

3.  Acute kidney injury.

4.  Hyponatremia.

5.  Hematoma noted on his right upper leg area.

6.  Gait instability with frequent falls.

7.  Benign prostatic hypertrophy.

8.  Deafness.

9.  Chronic obstructive pulmonary disease.



CONSULTATION:  Chai Roberson MD.



HISTORY OF PRESENT ILLNESS:  Briefly, the patient is a 77-year-old deaf 
gentleman who resides at [facility].  He was recently hospitalized in 
October of this year for generalized weakness.  He presented from [facility] 
with confusion, general weakness, and falls over the last few days.  He was 
very agitated on his admission.  He had a CT of his head performed, which 
showed moderate atrophy.  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 
lacunar infarcts, but nothing acute.  In addition, he had a femur x-ray 
performed because he had been falling, which was a limited negative 
examination.  Subsequently, he was seen and evaluated by Neurology.  The 
neurologist believed that the main issue regarding the patient's behavior is a 
breakdown in communication with the staff at [facility].  He has not had any 
significant issues here, but we have also had a  here who was able to 
interpret information to the patient.  In addition, his trazodone has been 
discontinued.  His night dose of clonazepam has been discontinued.  His p.r.n. 
doses have remained.  Recommendation, if he does get violent, to do a trial of 
Seroquel p.r.n. for severe agitation or a low dose of olanzapine.  Today, he 
will be discharged back to [facility] with physical therapy and occupational 
therapy.



HOSPITAL COURSE:  

1.  Acute on chronic encephalopathy.  He did not have any infectious etiology.  
He has improved.

2.  Alzheimer dementia.  His daughter said he does get agitated quickly and can 
be difficult to manage.  Also, he does not want to walk.  He prefers a 
wheelchair.

3.  Acute kidney injury.  This improved with hydration.  His creatinine is 1.  
It was 2.1 on admission.

4.  Hyponatremia, also improved.

5.  Hematoma on the right upper leg area.  X-rays negative.

6.  Gait instability.  PT and OT will be ordered at the skilled nursing 
facility.

7.  BPH, on Proscar.

8.  Deafness.  Does best if someone is skilled in sign language. 

9.  COPD.  Oxygen levels are stable on room air.



DISCHARGE CONDITION:  Stable.  Blood pressure is 163/72, heart rate is 61, 
respiratory rate is 18, O2 saturation on room air 93%, temperature 36.8 Celsius.



MEDICATIONS AT DISCHARGE:  Please see the EMR.  Trazodone has been discontinued 
as well as his night dose of Klonopin.  Melatonin has been in place, and this 
dose can be increased.



DISCHARGE INSTRUCTIONS:  

1.  Recommendation is for a certified  to be available whenever 
possible at [facility].

2.  Try melatonin for sleep.  



Greater than 30 minutes discharging and coordinating the patient's care.





Job #:  [id number]

MTDD

## 2017-11-17 NOTE — HOSPPROG
Hospitalist Progress Note


Assessment/Plan: 





Patient is a 68-year-old male who was recently hospitalized in October for 

generalized weakness.  He presented from [facility] with confusion, general 

weakness and reported falls in the last few days.  





* altered mental status, acute encephalopathy


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


-in addition he is on trazodone and Klonopin which could be affecting him


-will hold medications that cause sedation and see if he clears


-CT of the head shows no acute findings


-chest x-ray does not indicate any infectious etiology


-reviewed his care w Dr Aldridge, please see his consult





* Alzheimer dementia


-spoke with patient's daughter, Brittney. She says her father's baseline is 

agitation and difficulty with walking/ does best in a wheelchair


-he did very well today with the . He is calm and cooperative.





* acute kidney injury


-creatinine improved after getting hydration.  Was 2.1 and now is 1.0





* hypernatremia


-Na is 145/ bit elevated





* hematoma noted in the right upper leg area





* gait instability with frequent falls


-patient is impulsive at times


-PT and OT recommending rehab


-patient wants to use a wheelchair


-femur x-ray shows nothing acute





* BPH


-on Proscar





* hearing loss


-patient could only comprehend via an  from sign language


-when I evaluated him he does not appear that he reads lips





* COPD


-oxygen level stable on room air





* plan. dc back to [facility]/asking that consult from Dr Aldridge be sent 

with him


Subjective: Emiliano has no complaints/ met with him via the .


Objective: 


 Vital Signs











Temp Pulse Resp BP Pulse Ox


 


 36.8 C   61   18   163/72 H  93 


 


 11/17/17 10:57  11/17/17 10:57  11/17/17 10:57  11/17/17 10:57  11/17/17 10:57








 Laboratory Results





 11/17/17 09:35 





 11/17/17 09:35 





 











PT  16.5 SEC (12.0-15.0)  H  11/16/17  05:02    


 


INR  1.33  (0.83-1.16)  H  11/16/17  05:02    














- Physical Exam


Constitutional: not in pain, unkempt, cachectic


Eyes: PERRL


Ears, Nose, Mouth, Throat: other (deaf)


Cardiovascular: regular rate and rhythym


Respiratory: no respiratory distress


Skin: warm, No normal color (pale)


Musculoskeletal: generalized weakness


Neurologic: other (alert and impulsive at times)


Psychiatric: poor insight, poor judgement





ICD10 Worksheet


Patient Problems: 


 Problems











Problem Status Onset


 


TUSHAR (acute kidney injury) Acute  


 


Fall Acute  


 


Generalized weakness Acute  


 


Pyelonephritis, acute Acute

## 2017-11-17 NOTE — ASDISCHSUM
----------------------------------------------

Discharge Information

----------------------------------------------

Plan Status:SNF                                      Medically Cleared to Leave:11/16/2017

Discharge Date:11/16/2017                            CM D/C Disposition:Skilled Nursing Facility

ADT D/C Disposition:Skilled Nursing Facility         Projected Discharge Date:11/17/2017 11:00 AM

Transportation at D/C:Wheelchair Van                 Discharge Delay Reason:

Follow-Up Date:11/17/2017 11:00 AM                   Discharge Slot:

Final Diagnosis:

----------------------------------------------

Placement Information

----------------------------------------------

Referral Type:*Nursing Home/SNF                      Referral ID:[id number]

Provider Name:Antonino Livingston/[facility]

Address 1:[address]                         Phone Number:

Address 2:                                           Fax Number:

[facility]:[place]                                         Selection Factors:

State:[place]

 

----------------------------------------------

Patient Contact Information

----------------------------------------------

Contact Name:JR                        Relationship:Daughter

Address:                                             Home [phone]

                                                     Work [phone]

City:                                                [facility] Phone:

[facility]/[employer] Code:                                      Email:

----------------------------------------------

Financial Information

----------------------------------------------

Financial Class:Medicare Advantage Plans

Primary Plan Desc:[employer] ADV IP             Primary Plan Number:[id number]

Secondary Plan Desc:                                 Secondary Plan Number:

 

 

----------------------------------------------

Assessment Information

----------------------------------------------

----------------------------------------------

[facility] CM Progress Note

----------------------------------------------

CM Note

 

CM Note                       

Notes:

Jayashree w/RN, pt lives at [facility], anticipate will return there when medically stable, PT/OT 

evals pending. When pt goes back may benefit from restorative therapies through [facility]. RAIMUNDO w/f.



Current DC Plan: Back to /[facility]

 

Date Signed:  11/16/2017 12:04 PM

Electronically Signed By:Ivelisse Thomas RN

 

 

----------------------------------------------

Case Management Discharge Plan Note

----------------------------------------------

Case Management Discharge

 

Discharge Order Complete?     Answers:  Yes                                   

Patient to Obtain             Answers:  Other                         Notes:  [facility]

Medications                                                                   

Transportation Arranged       Answers:  Other                         Notes:  Antonino Buck W/C 

                                                                              Transport

Transport will Pick (Date     11/17/2017 04:00 PM

& Time)                       

Faxed Final Orders            Answers:  Yes                                   

Family Notified               Answers:  No                                    

Discharge Comments            

Notes:

Pt completed dialysis today and is returning to [facility]. Transport set up by .

 

Date Signed:  11/17/2017 03:02 PM

Electronically Signed By:CARLIN Cao

 

 

----------------------------------------------

[facility] CM Progress Note

----------------------------------------------

CM Note

 

CM Note                       

Notes:

Please disregard CM Discharge Plan Note. This pt discharged back to [facility] not 

[facility]. Transport set up by .

 

Date Signed:  11/17/2017 03:05 PM

Electronically Signed By:CARLIN Cao

 

 

----------------------------------------------

Intervention Information

----------------------------------------------

## 2017-11-17 NOTE — PDIAF
- Diagnosis


Diagnosis: acute encephalopathy/ deafness, renal insuff/ dehydration





- Medication Management


Discharge Medications: 


 Medications to Continue on Transfer





Acetaminophen [Tylenol 325mg (*)] 650 mg PO Q6 PRN 10/19/17 [Last Taken Unknown]


Albuterol [Proventil Inhaler HFA (*)] 2 puffs IH Q4H PRN 10/19/17 [Last Taken 

Unknown]


Cholecalciferol Vit D3 [Vitamin D3 (*)] 1,000 units PO DAILY 10/19/17 [Last 

Taken Unknown]


Escitalopram Oxalate [Lexapro 10 MG] 10 mg PO DAILY 10/19/17 [Last Taken Unknown

]


Finasteride [Proscar 5 MG (*)] 5 mg PO DAILY 10/19/17 [Last Taken Unknown]


Propranolol HCl 80 mg PO BID 10/19/17 [Last Taken Unknown]


SUMAtriptan [Imitrex 50 MG (*)] 50 mg PO DAILY PRN 10/19/17 [Last Taken Unknown]


Sennosides [Senokot] 1 each PO BID PRN 10/19/17 [Last Taken Unknown]


Tiotropium Inhaler [Spiriva Inhaler (RX)] 1 inh IH DAILY 10/19/17 [Last Taken 

Unknown]


clonazePAM [Klonopin (*)] 0.5 mg PO Q8 PRN 10/19/17 [Last Taken Unknown]


Acetaminophen [Tylenol 325mg (*)] 650 mg PO Q4HRS PRN  tab 11/17/17 [Last Taken 

Unknown]


Melatonin [Melatonin 3 MG (*)] 3 mg PO HS #30 tab 11/17/17 [Last Taken Unknown]








Discharge Medications: Refer to the Discharge Home Medication list for PRN 

reason.





- Orders


Services needed: Physical Therapy, Occupational Therapy


Diet Recommendation: no restrictions on diet


Diet Texture: Regular Texture Diet


Additional: Trazadone and Klonopin qhs were discontinued due to falling. 

Recommendation is to stop klonopin, may be causing paradoxical symptoms.  Use 

Melatonin at night for sleep/ can increase this dose if needed.  Review note 

from neurology while at the hospital/ could use seroquel or zyprexa if needed.  

Recommendation is if possible to get someone who is a certified sign  

to help explain things to him.





- Labs/Radiology


BMP Date: 11/20/17 (monthly)





- Follow Up Care


Current Providers and Referrals: 


ELISE TRONCOSO [Other] - As per Instructions

## 2017-12-30 ENCOUNTER — HOSPITAL ENCOUNTER (EMERGENCY)
Dept: HOSPITAL 80 - FED | Age: 77
Discharge: HOME | End: 2017-12-30
Payer: COMMERCIAL

## 2017-12-30 VITALS — HEART RATE: 86 BPM

## 2017-12-30 VITALS
RESPIRATION RATE: 18 BRPM | TEMPERATURE: 97.3 F | OXYGEN SATURATION: 94 % | SYSTOLIC BLOOD PRESSURE: 179 MMHG | DIASTOLIC BLOOD PRESSURE: 88 MMHG

## 2017-12-30 DIAGNOSIS — Z87.891: ICD-10-CM

## 2017-12-30 DIAGNOSIS — I10: ICD-10-CM

## 2017-12-30 DIAGNOSIS — S52.502A: Primary | ICD-10-CM
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
## 2017-12-30 DIAGNOSIS — J44.9: ICD-10-CM

## 2017-12-30 PROCEDURE — 0PSJXZZ REPOSITION LEFT RADIUS, EXTERNAL APPROACH: ICD-10-PCS | Performed by: EMERGENCY MEDICINE

## 2017-12-30 PROCEDURE — 25605 CLTX DST RDL FX/EPHYS SEP W/: CPT

## 2017-12-30 PROCEDURE — 99283 EMERGENCY DEPT VISIT LOW MDM: CPT

## 2017-12-30 PROCEDURE — 73110 X-RAY EXAM OF WRIST: CPT

## 2017-12-30 PROCEDURE — A4565 SLINGS: HCPCS

## 2017-12-30 NOTE — EDPHY
H & P


Smoking Status: Former smoker


Time Seen by Provider: 12/30/17 20:27


HPI/ROS: 





CHIEF COMPLAINT:  Witnessed fall, positive wrist fracture





HISTORY OF PRESENT ILLNESS:  77-year-old male presents to the emergency 

department by ambulance with left wrist injury. The patient is a resident at 

[facility] and had a witnessed, mechanical fall yesterday.  Today the 

obtained x-rays which were positive for distal radius fracture and he was sent 

to the emergency department for evaluation.  The patient is deaf and mute.  Per 

staff at [facility], he did not hit his head or lose consciousness.  No 

vomiting.  The staff noticed swelling to his left wrist and x-rays were 

obtained. No noted injuries to his lower extremities. He did not hit his head 

or lose consciousness per EMS.





REVIEW OF SYSTEMS:


Constitutional:  No fever, no chills.


Eyes:  No double or blurry vision.


ENT:  No sore throat.


Respiratory:  No cough, no shortness of breath.


Cardiac:  No chest pain.


Gastrointestinal:  No abdominal pain, vomiting or diarrhea.


Genitourinary:  No dysuria.


Musculoskeletal:  No neck or back pain.


Skin:  No rashes.


Neurological:  No headache. (Jadyn Kelleya LILIA)


Past Medical/Surgical History: 





Deaf, mute, dementia, hypertension, COPD, GERD, migraine headaches (Warren

Silvina LILIA)


Social History: 





Resident at [facility] (Silvina Kelley M)


Physical Exam: 





General Appearance:  Alert, no distress. No visible signs of trauma to his head.


Eyes:  Pupils equal and round.  Extraocular motions are all intact.


ENT:  Mouth:  Mucous membranes moist.


Respiratory:  No wheezing, rhonchi, or rales, lungs are clear to auscultation.


Cardiovascular:  Regular rate and rhythm.


Gastrointestinal:  Abdomen is soft and nontender, no masses, no rebound or 

guarding, bowel sounds normal.


Neurological:  uncooperative, cannot determine.


Skin:  Warm and dry, no rashes.


Musculoskeletal:  Nontender to palpate along the cervical, thoracic or lumbar 

spine.  Neck is supple.


Extremities:  Patient has swelling noted to the left wrist.  He seems to be 

moving his left wrist without any pain.  He has limited supination however.  No 

abrasion or puncture wound.  Strong radial pulse at the left wrist.


Psychiatric:  mild agitation. (WarrenSilvina LILIA)


Constitutional: 





 Initial Vital Signs











Temperature (C)  36.3 C   12/30/17 20:35


 


Heart Rate  54 L  12/30/17 20:35


 


Respiratory Rate  18   12/30/17 20:35


 


Blood Pressure  179/88 H  12/30/17 20:35


 


O2 Sat (%)  94   12/30/17 20:35








 











O2 Delivery Mode               Room Air














Allergies/Adverse Reactions: 


 





iodine Allergy (Verified 12/30/17 20:40)


 








Home Medications: 














 Medication  Instructions  Recorded


 


Albuterol [Proventil Inhaler HFA 2 puffs IH Q4H PRN 10/19/17





(*)]  


 


Cholecalciferol Vit D3 [Vitamin D3 1,000 units PO DAILY 10/19/17





(*)]  


 


Escitalopram Oxalate [Lexapro 10 10 mg PO DAILY 10/19/17





MG]  


 


Finasteride [Proscar 5 MG (*)] 5 mg PO DAILY 10/19/17


 


Propranolol HCl 80 mg PO BID 10/19/17


 


SUMAtriptan [Imitrex 50 MG (*)] 50 mg PO DAILY PRN 10/19/17


 


Sennosides [Senokot] 1 each PO BID PRN 10/19/17


 


Tiotropium Inhaler [Spiriva 1 inh IH DAILY 10/19/17





Inhaler (RX)]  


 


clonazePAM [Klonopin (*)] 0.5 mg PO Q8 PRN 10/19/17


 


Acetaminophen [Tylenol 325mg (*)] 650 mg PO Q4HRS PRN  tab 11/17/17


 


Melatonin [Melatonin 3 MG (*)] 3 mg PO HS #30 tab 11/17/17














Medical Decision Making





- Diagnostics


Imaging: I viewed and interpreted images myself


Procedures: 





The patient was given a hematoma block with 0.5% bupivacaine.  His fracture was 

then strained which traction and placed in a splint with nurse and tech 

assistant.  He tolerated the procedure well. C-arm was used.





Procedure:  Splint placement.





A sugar-tong splint was applied.  After application of the splint I returned 

and re-examined the patient.  The splint was adequately immobilizing the joint 

and distal to the splint the patient's circulation and sensation was intact. (

Chai Mcmanus)


ED Course/Re-evaluation: 





77-year-old male presents with witnessed mechanical fall from yesterday.  X-

rays reveal distal radius fracture which is angulated.





Reduction performed by Dr. Chai Mcmanus, see Dr. Chai Mcmanus use procedure 

note.





Patient was placed in a splint and sling and examined post application in good 

placement with normal CNS.  He was instructed to keep this on until follow-up 

with orthopedic surgeon next week.  Patient will be discharged back to [facility]. (Silvina Kelley)


Differential Diagnosis: 





Including but not limited to fracture, dislocation, contusion, sprain, 

compartment syndrome, cellulitis (Silvina Kelley)





Departure





- Departure


Disposition: Home, Routine, Self-Care


Clinical Impression: 


Left wrist fracture


Qualifiers:


 Encounter type: initial encounter Fracture type: closed Qualified Code(s): 

S62.102A - Fracture of unspecified carpal bone, left wrist, initial encounter 

for closed fracture





Condition: Good


Instructions:  Wrist Fracture in Adults (ED)


Additional Instructions: 


Keep splint and sling on until follow up with orthopedic doctor next week.





Referrals: 


Bonnie Funez MD [Medical Doctor] - 2-3 days without fail (Orthopedic surgeon on-

call)

## 2018-01-07 ENCOUNTER — HOSPITAL ENCOUNTER (INPATIENT)
Dept: HOSPITAL 80 - FED | Age: 78
LOS: 8 days | Discharge: NURSING, REHAB OR LONG-TERM CARE | DRG: 177 | End: 2018-01-15
Attending: INTERNAL MEDICINE | Admitting: INTERNAL MEDICINE
Payer: COMMERCIAL

## 2018-01-07 DIAGNOSIS — G93.40: ICD-10-CM

## 2018-01-07 DIAGNOSIS — J69.0: Primary | ICD-10-CM

## 2018-01-07 DIAGNOSIS — E87.0: ICD-10-CM

## 2018-01-07 DIAGNOSIS — R00.1: ICD-10-CM

## 2018-01-07 DIAGNOSIS — E87.6: ICD-10-CM

## 2018-01-07 DIAGNOSIS — J44.9: ICD-10-CM

## 2018-01-07 DIAGNOSIS — Z51.5: ICD-10-CM

## 2018-01-07 DIAGNOSIS — N13.8: ICD-10-CM

## 2018-01-07 DIAGNOSIS — R33.8: ICD-10-CM
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
## 2018-01-07 DIAGNOSIS — K21.9: ICD-10-CM

## 2018-01-07 DIAGNOSIS — E43: ICD-10-CM
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
## 2018-01-07 DIAGNOSIS — N40.1: ICD-10-CM

## 2018-01-07 DIAGNOSIS — S62.102A: ICD-10-CM

## 2018-01-07 DIAGNOSIS — H90.5: ICD-10-CM

## 2018-01-07 DIAGNOSIS — F03.90: ICD-10-CM

## 2018-01-07 DIAGNOSIS — I10: ICD-10-CM

## 2018-01-07 LAB — PLATELET # BLD: 164 10^3/UL (ref 150–400)

## 2018-01-07 RX ADMIN — SODIUM CHLORIDE SCH MLS: 450 INJECTION, SOLUTION INTRAVENOUS at 17:26

## 2018-01-07 RX ADMIN — TAZOBACTAM SODIUM AND PIPERACILLIN SODIUM SCH MLS: 500; 4 INJECTION, SOLUTION INTRAVENOUS at 22:13

## 2018-01-07 RX ADMIN — Medication SCH MG: at 20:18

## 2018-01-07 NOTE — EDPHY
HPI/HX/ROS/PE/MDM


Narrative: 





CHIEF COMPLAINT: Lethargy, weakness 





HPI: 





This patient is a deaf 76 y/o male with history of dementia, COPD arriving via 

EMS from [facility] for evaluation of weakness and lethargy. EMS reports his 

daughter visits weekly on Sundays, and this morning she noted he seemed 

lethargic and altered. They communicate by sign language, and he seemed to be 

poorly responsive today. Vitals were stable in transport. The patient has a 

productive cough as well. 





Further HPI unobtainable at this time. Patient's daughter and sign language 

 in route. 





REVIEW OF SYSTEMS:


Aside from elements discussed in the HPI, a comprehensive 10-point review of 

systems was reviewed and is negative.





PMH:


1. COPD


2. Hypertension 


3. Dementia 


4. BPH


5. GERD


6. Deaf since age 1. 








SOCIAL HISTORY: Kim patient. Lives at [facility]. Daughter at bedside. 

Retired. 





PHYSICAL EXAM:


General:Patient is alert, in no acute distress.


ENT: Dry mucous membranes. Eyes are normal to inspection.  ENT inspection 

normal.


Neck: Normal inspection.  Full range of motion.


Respiratory:No respiratory distress.  Breath sounds normal bilaterally.


Cardiovascular: Regular rate and rhythm.  Strong peripheral pulses.  Normal cap 

refill.


Abdomen:The abdomen is nontender to palpation. There are no peritoneal signs. 

There are normal bowel sounds.


Back: Normal to inspection.  No tenderness to palpation.


Skin: Normal color.  No rash.  Warm and dry.


Extremities: Normal appearance.  Full range of motion.


Neuro: Oriented x3.  Normal motor function.  Normal sensory function.





ED Course: 





13:20 met EMS at bedside 





76 y/o male presents with weakness and productive cough. Plan for EKG, chest x-

ray, labs including CBC, BMP, troponin, flu swab. Plan to administer 1L IV NS. 

The patient was recently treated here one week ago for a left wrist fracture. 

Exam reveals mild tenderness and obvious swelling and deformity to the left 

wrist. His daughter at bedside states that the staff at his nursing facility 

would not maintain his splint as he continually attempted to remove it. She is 

very concerned and displeased regarding this treatment by nursing home staff. 

Plan for repeat left wrist x-ray. 





14:19 Reviewed chest x-ray. Evidence of right upper lobe infiltrate. 





Plan to administer 750mg IV Levaquin. Plan to admit patient for pneumonia. 





Wrist x-ray confirms stale distal radius fracture, displaced fracture of the 

ulnar styloid. Plan to consult with orthopedic surgeon on call. 





14:28 Spoke with hospitalist service. Dr. Tuttle accepts admission for 

pneumonia. 





14:57 Spoke with Dr. Bran, orthopedic specialist. He will consult. 





- Data Points


Imaging Results: 


 Imaging Impressions





Chest X-Ray  01/07/18 13:26


Impression: Possible early pneumonia in the right upper lobe. Mild interstitial 

prominence and peribronchial wall thickening, which is stable and could be 

secondary to chronic interstitial lung disease. Other chronic findings, as above

, which are stable.








Wrist X-Ray  01/07/18 13:32


Impression: Comminuted intraarticular fracture of the distal left radius with 

stable alignment. Displaced fracture of the ulnar styloid.











Imaging: I viewed and interpreted images myself


Laboratory Results: 


 Laboratory Results





 01/07/18 13:31 





 01/07/18 13:31 





 











  01/07/18 01/07/18 01/07/18





  13:31 13:31 13:30


 


WBC    13.90 10^3/uL H 10^3/uL  





    (3.80-9.50)  


 


RBC    4.15 10^6/uL L 10^6/uL  





    (4.40-6.38)  


 


Hgb    13.2 g/dL L g/dL  





    (13.7-17.5)  


 


Hct    39.2 % L %  





    (40.0-51.0)  


 


MCV    94.5 fL fL  





    (81.5-99.8)  


 


MCH    31.8 pg pg  





    (27.9-34.1)  


 


MCHC    33.7 g/dL g/dL  





    (32.4-36.7)  


 


RDW    15.1 % %  





    (11.5-15.2)  


 


Plt Count    164 10^3/uL 10^3/uL  





    (150-400)  


 


MPV    10.8 fL fL  





    (8.7-11.7)  


 


Neut % (Auto)    84.9 % H %  





    (39.3-74.2)  


 


Lymph % (Auto)    10.6 % L %  





    (15.0-45.0)  


 


Mono % (Auto)    3.9 % L %  





    (4.5-13.0)  


 


Eos % (Auto)    0.0 % L %  





    (0.6-7.6)  


 


Baso % (Auto)    0.2 % L %  





    (0.3-1.7)  


 


Nucleat RBC Rel Count    0.0 % %  





    (0.0-0.2)  


 


Absolute Neuts (auto)    11.79 10^3/uL H 10^3/uL  





    (1.70-6.50)  


 


Absolute Lymphs (auto)    1.48 10^3/uL 10^3/uL  





    (1.00-3.00)  


 


Absolute Monos (auto)    0.54 10^3/uL 10^3/uL  





    (0.30-0.80)  


 


Absolute Eos (auto)    0.00 10^3/uL L 10^3/uL  





    (0.03-0.40)  


 


Absolute Basos (auto)    0.03 10^3/uL 10^3/uL  





    (0.02-0.10)  


 


Absolute Nucleated RBC    0.00 10^3/uL 10^3/uL  





    (0-0.01)  


 


Immature Gran %    0.4 % %  





    (0.0-1.1)  


 


Immature Gran #    0.06 10^3/uL 10^3/uL  





    (0.00-0.10)  


 


Sodium  153 mEq/L H mEq/L    





   (134-144)   


 


Potassium  3.5 mEq/L mEq/L    





   (3.5-5.2)   


 


Chloride  110 mEq/L mEq/L    





   ()   


 


Carbon Dioxide  31 mEq/l mEq/l    





   (22-31)   


 


Anion Gap  12 mEq/L mEq/L    





   (8-16)   


 


BUN  43 mg/dL H mg/dL    





   (7-23)   


 


Creatinine  1.2 mg/dL mg/dL    





   (0.7-1.3)   


 


Estimated GFR  58     





    


 


Glucose  93 mg/dL mg/dL    





   ()   


 


Calcium  9.9 mg/dL mg/dL    





   (8.5-10.4)   


 


Troponin I  0.016 ng/mL ng/mL    





   (0.000-0.034)   


 


Nasal Influenza A PCR      NEGATIVE FOR FLU A 





     (NEGATIVE) 


 


Nasal Influenza B PCR      NEGATIVE FOR FLU B 





     (NEGATIVE) 











Medications Given: 


 








Discontinued Medications





Sodium Chloride (Ns)  1,000 mls @ 0 mls/hr IV ONCE ONE; Wide Open


   PRN Reason: Protocol


   Stop: 01/07/18 13:25


   Last Admin: 01/07/18 14:10 Dose:  1,000 mls








General


Time Seen by Provider: 01/07/18 13:19


Initial Vital Signs: 


 Initial Vital Signs











Temperature (C)  36.4 C   01/07/18 13:52


 


Heart Rate  50 L  01/07/18 13:52


 


Respiratory Rate  18   01/07/18 13:52


 


Blood Pressure  168/95 H  01/07/18 13:52


 


O2 Sat (%)  90 L  01/07/18 13:52








 











O2 Delivery Mode               Room Air














Allergies/Adverse Reactions: 


 





iodine Allergy (Verified 12/30/17 20:40)


 








Home Medications: 














 Medication  Instructions  Recorded


 


Albuterol [Proventil Inhaler HFA 2 puffs IH Q4H PRN 10/19/17





(*)]  


 


Cholecalciferol Vit D3 [Vitamin D3 1,000 units PO DAILY 10/19/17





(*)]  


 


Escitalopram Oxalate [Lexapro 10 10 mg PO DAILY 10/19/17





MG]  


 


Finasteride [Proscar 5 MG (*)] 5 mg PO DAILY 10/19/17


 


Propranolol HCl 80 mg PO BID 10/19/17


 


SUMAtriptan [Imitrex 50 MG (*)] 50 mg PO Q23H PRN 10/19/17


 


Sennosides [Senokot] 1 each PO BID PRN 10/19/17


 


clonazePAM [Klonopin (*)] 0.5 mg PO BID 10/19/17


 


Melatonin [Melatonin 3 MG (*)] 3 mg PO HS #30 tab 11/17/17


 


Acetaminophen [Tylenol 325mg (*)] 650 mg PO Q6HRS PRN 01/07/18


 


Tiotropium Inhaler [Spiriva 18 mcg IH DAILY 01/07/18





Handihaler]  














Departure





- Departure


Disposition: [facility] Acute


Clinical Impression: 


Pneumonia


Qualifiers:


 Pneumonia type: due to unspecified organism Laterality: right Lung location: 

upper lobe of lung Qualified Code(s): J18.1 - Lobar pneumonia, unspecified 

organism





Left wrist fracture


Qualifiers:


 Encounter type: sequela Fracture type: closed Qualified Code(s): S62.102S - 

Fracture of unspecified carpal bone, left wrist, sequela





Condition: Fair


Report Scribed for: Mosse Ribeiro


Report Scribed by: Margaret Urias


Date of Report: 01/07/18


Time of Report: 13:26


Physician Review and Approval Statement: Portions of this note were transcribed 

by an ED scribe.  I personally performed the history, physical exam, and 

medical decision making; and confirm the accuracy of the information in the 

transcribed note.

## 2018-01-07 NOTE — CPEKG
Heart Rate: 56

RR Interval: 1071

P-R Interval: 204

QRSD Interval: 98

QT Interval: 492

QTC Interval: 475

P Axis: 76

QRS Axis: -18

T Wave Axis: 91

EKG Severity - BORDERLINE ECG -

EKG Impression: SINUS RHYTHM

EKG Impression: PROBABLE LEFT ATRIAL ABNORMALITY

EKG Impression: BORDERLINE LEFT AXIS DEVIATION

Electronically Signed By: Marcelino Flores 08-Jan-2018 20:30:03

## 2018-01-07 NOTE — GHP
[f rep st]



                                                            HISTORY AND PHYSICAL





DATE OF ADMISSION:  01/07/2018



CHIEF COMPLAINT:  Weakness, lethargy and confusion.



HISTORY:  The patient is a 77-year-old male who resides at [facility].  He fractured his wrist 1 w
Eyak ago and is now wearing a splint.  He needs surgery for this wrist within the next 2 weeks.  He ha
s been refusing to wear his splint at [facility] and the daughter is very upset that they did not 
insist on him wearing it.  The patient is deaf and history is obtained through an .  This 
is difficult however as the patient is confused.  He does not know where he is.  He denies any chest 
pain or shortness of breath.  He is asking for a drink of water.  He denies any wrist pain.



PAST MEDICAL HISTORY:  

1.  Dementia.

2.  Deafness.

3.  BPH.

4.  COPD.



MEDICATIONS:  Please see computer record for full detailed list.



ALLERGIES:  Iodine.



SOCIAL HISTORY:  Unknown smoking and alcohol status.  He lives a [facility].



CODE STATUS:  Full.



REVIEW OF SYSTEMS:  Complete review of systems obtained.  Review of systems negative on constitutiona
l, HEENT, GI, pulmonary vascular, , hematology, skin, muscular, endocrine and psych except for posi
tives and negatives as noted in HPI.



FAMILY HISTORY:  Reviewed, noncontributory to presenting complaint.



PHYSICAL EXAMINATION:  GENERAL:  Well-developed, well-nourished male, in no distress.  VITAL SIGNS:  
Temperature 36.4, pulse of 50, blood pressure 168/95, saturating 90% on room air.  HEENT:  Normal con
junctivae.  Pupils equal and reactive to light.  ENT:  Normal ears and nose.  The patient is complete
ly deaf and the history is obtained through . Edentulous.  Oropharynx is dry.  NECK:  Trac
hea is midline.  No thyromegaly.  CHEST:  Normal respiratory effort.  LUNGS:  Clear to auscultation b
ilaterally.  CARDIOVASCULAR:  Regular rhythm.  No murmur.  EXTREMITIES:  No extremity edema.  ABDOMEN
:  Soft, nontender.  No hepatosplenomegaly.  SKIN:  Warm, dry, intact without rash.  MUSCULOSKELETAL:
  No cyanosis or clubbing.  Strength 5/5 upper and lower extremities.  NEUROLOGIC:  Cranial nerves in
tact.  Normal sensation to light touch.  PSYCHIATRIC:  Confused, does not know where he is.  Poor catrina
gment and insight.  Poor memory.



LABS:  White count 13.9, hematocrit 39.2, platelets 164.  Sodium 153, potassium 3.5, chloride 110, bi
carb 31, BUN 43, creatinine 1.2, glucose 93.  Troponin is negative.  Influenza is negative.  



EKG viewed by me.  My personal interpretation is normal sinus rhythm, left atrial enlargement.  



Chest x-ray shows early pneumonia right upper lobe.  



Wrist x-ray shows an intra-articular fracture in the distal left radius.



ASSESSMENT AND PLAN:  

1.  Right upper lobe pneumonia, aspiration versus nursing home acquired.  We will start him on IV Zos
yn and check a swallow evaluation. 

2.  Hypernatremia.  This needs to be corrected slowly.  I suspect this is due to poor oral intake.  W
e will start with half normal saline and follow serial sodiums.

3.  Wrist fracture status post splint.  I spoke with Dr. Reyes.  This will need surgery when he is 
medically stable in the next 1-2 weeks.  They would like him to follow up as an outpatient post disch
arge and they will schedule surgery.  He must wear the splint at all times.  He may wear a cock-up sp
lint if it is more comfortable.  

4.  Dementia with superimposed metabolic encephalopathy secondary to pneumonia.  Need to clarify with
 daughter but I suspect he is more confused than baseline.

5.  Deafness.  Superimposed on this he is confused.  This makes communication difficult even with an 
.

6.  Chronic obstructive pulmonary disease.  I do not hear any evidence of acute exacerbation.  Will c
ontinue Spiriva and albuterol as needed.

7.  Bradycardia, unclear if this is symptomatic.  We will watch him on telemetry and check a TSH.



CODE STATUS:  Full.



ADMISSION STATUS:  Will admit to inpatient.  Anticipate greater than 2 midnights for stabilization.



DVT PROPHYLAXIS:  He is high risk.  Will place on subcu Lovenox.





Job #:  [id number]

## 2018-01-07 NOTE — PDMN
Medical Necessity


Medical necessity: C/M review:  Patient meets INPT criteria under [facility] M-253 

Pneumnonia due to aspiratrion, M-282 Pneumonia community acquired:  Acute and 

persistent right upper lobe pneumonia, aspiration versus healthcare acquired, 

WBC 13.90, hypernatremia, Na 153, bradycardia, heart rate 50-59 requiring 

ongoing IV Zosyn Q 8 hrs.,IV fluids,  cardiac monitoring, pulse oximetry, acute 

PT/OT/ST, comorbid prior left wrist fracture present on admit, dementia, 

deafness, COPD.  MD anticipates > 2 MN LOS for ongoing med nec for eval and TX 

of above.  Patient is Medicare Advantage which follows guidelines put forth by 

CMS.
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
## 2018-01-08 LAB — PLATELET # BLD: 125 10^3/UL (ref 150–400)

## 2018-01-08 RX ADMIN — SODIUM CHLORIDE SCH MLS: 450 INJECTION, SOLUTION INTRAVENOUS at 11:52

## 2018-01-08 RX ADMIN — TAZOBACTAM SODIUM AND PIPERACILLIN SODIUM SCH MLS: 500; 4 INJECTION, SOLUTION INTRAVENOUS at 05:42

## 2018-01-08 RX ADMIN — TAZOBACTAM SODIUM AND PIPERACILLIN SODIUM SCH MLS: 500; 4 INJECTION, SOLUTION INTRAVENOUS at 21:12

## 2018-01-08 RX ADMIN — Medication SCH MG: at 21:11

## 2018-01-08 RX ADMIN — ACETAMINOPHEN PRN MG: 325 TABLET ORAL at 19:52

## 2018-01-08 RX ADMIN — DEXTROSE MONOHYDRATE, SODIUM CHLORIDE, AND POTASSIUM CHLORIDE SCH MLS: 50; 4.5; 1.49 INJECTION, SOLUTION INTRAVENOUS at 19:48

## 2018-01-08 RX ADMIN — TIOTROPIUM BROMIDE SCH: 18 CAPSULE ORAL; RESPIRATORY (INHALATION) at 09:58

## 2018-01-08 RX ADMIN — FINASTERIDE SCH MG: 5 TABLET, FILM COATED ORAL at 10:07

## 2018-01-08 RX ADMIN — ENOXAPARIN SODIUM SCH MG: 100 INJECTION SUBCUTANEOUS at 10:06

## 2018-01-08 RX ADMIN — TAZOBACTAM SODIUM AND PIPERACILLIN SODIUM SCH MLS: 500; 4 INJECTION, SOLUTION INTRAVENOUS at 14:20

## 2018-01-09 LAB — PLATELET # BLD: 125 10^3/UL (ref 150–400)

## 2018-01-09 RX ADMIN — Medication SCH MG: at 20:37

## 2018-01-09 RX ADMIN — TAZOBACTAM SODIUM AND PIPERACILLIN SODIUM SCH MLS: 500; 4 INJECTION, SOLUTION INTRAVENOUS at 13:58

## 2018-01-09 RX ADMIN — TAZOBACTAM SODIUM AND PIPERACILLIN SODIUM SCH MLS: 500; 4 INJECTION, SOLUTION INTRAVENOUS at 05:14

## 2018-01-09 RX ADMIN — DEXTROSE MONOHYDRATE, SODIUM CHLORIDE, AND POTASSIUM CHLORIDE SCH MLS: 50; 4.5; 1.49 INJECTION, SOLUTION INTRAVENOUS at 05:14

## 2018-01-09 RX ADMIN — TAZOBACTAM SODIUM AND PIPERACILLIN SODIUM SCH MLS: 500; 4 INJECTION, SOLUTION INTRAVENOUS at 22:05

## 2018-01-09 RX ADMIN — ENOXAPARIN SODIUM SCH MG: 100 INJECTION SUBCUTANEOUS at 12:19

## 2018-01-09 RX ADMIN — FINASTERIDE SCH MG: 5 TABLET, FILM COATED ORAL at 09:37

## 2018-01-09 RX ADMIN — TIOTROPIUM BROMIDE SCH: 18 CAPSULE ORAL; RESPIRATORY (INHALATION) at 08:51

## 2018-01-09 NOTE — ECHO
[email or web address]





[address] [address] 

Main: [phone] 



Fax: 



Transthoracic Echocardiogram 

Name:             LUPE HILLIARD                        MR#:

[id number]

Study Date:       2018                             Study Time:

01:16 PM

YOB: 1940                             Age:

77 year(s)

Height:           190.5 cm (75 in.)                      Weight:

77.11 kg (170 lb.)

BSA:              2.05 m2                                Gender:

Male

Examination:      Echo                                   Indication:

Bradycardia/needs wrist surgery clearance

Image Quality:                                           Contrast: 

Requested by:     Elena Rodríguez                           BP:

133 mmHg/83 mmHg

Heart Rate:                                              Rhythm: 

Indication:       Bradycardia/needs wrist surgery clearance 



Procedure Staff 

Ultrasound Technician:   Fatoumata Arnold Physician:       Boris Curtis 

Requesting Provider: 



Conclusions:          Normal size left ventricle.  

Mild concentric LV hypertrophy.  

The left atrium is normal in size.  

Trivial mitral valve regurgitation.  

Trivial to mild tricuspid valve regurgitation.  

Trivial anterior pericardial effusion. 



Measurements: 

Chambers                    Valvular Assessment AV/MV

Valvular Assessment TV/PV



Normal                                  Normal

Normal

Name         Value    Range             Name        Value Range

Name          Value Range

Ao Mona (MM): 4.4 cm   (2.2 cm-3.7           AV Vmax:    0.96 m/s (1

m/s-1.7



cm)                                 m/s)  

AV maxP mmHg ( - )  

MV E Vmax:  0.35 m/s ( - )  

MV A Vmax:  0.61 m/s ( - )  

MV E/A:     0.57  ( - )  



Continued Measurements: 

Chambers                   Valvular Assessment AV/MV  



Name                    Value           Name                    Value



LADs:                 4.8 cm                MV E/E' Lateral:

9.50



Findings:             Left Ventricle: 

Normal size left ventricle. Mild concentric LV hypertrophy.  

Left Atrium: 



Patient: LUPE HILLIARD                      MRN: [id number]

Study Date: 2018   Page 1 of 2

01:16 PM 









The left atrium is normal in size.  

Mitral Valve: 

The mitral valve is normal in appearance. Trivial mitral valve

regurgitation.

Aortic Valve: 

Trivial aortic valve regurgitation.  

Tricuspid Valve: 

The tricuspid valve appears normal. Trivial to mild tricuspid valve

regurgitation.

Pericardium: 

Trivial anterior pericardial effusion. 







Electronically signed by Boris Curtis on 2018 at 04:55 PM 

(No Signature Object) 



Patient: LUPE HILLIARD                      MRN: [id number]

Study Date: 2018   Page 2 of 2

01:16 PM 







[id number]

## 2018-01-09 NOTE — HOSPPROG
Hospitalist Progress Note


Assessment/Plan: 





1. RUL infiltrate- possible aspiration PNA


-IV abx, pip/breann, Bd Cx NTD so likely switch to PO soon, flu neg


-SLP eval, swallow study today as more awake/alert


-pulm toilet as able


-O2 support as needed





2. Bradycardia,? asymptomatic


-hold b blockers, now 40-50s today


-tsh nl


-cardiology consulted and echo ordered (Dr Mtz)





3. Dementia with possible acute worsening/encephalopathy due to infection/

possible dehydration


-difficult to assess baseline


-left detailed message for daughter





4. Deaf


-appreciate interpretors availabilty





5. Hypernatremia/hypoK


-mild, gentle IVF hydration


-replace per protocol





6. L wrist fracture


-in splint


-needs surgical repair per ortho when stable (as outpt)


-cardiology consulted for surgical clearance too


-taking off splint, asked RN to try to stabilize in a diff way as able





7. Anemia


-follow, check iron levels





DISPO > 2 mdnts due to multiple med co-morbids, IV abx, assessment of feeding/

risks


FULL CODE- AD scanned


DVT prophy- lovenox


PCP- VALDE[facility] pt


[place] resident but concern from family re care, CM assisting


Subjective: Called daughter Earlene, no answer. Left detailed update message.  Used 

interpretor, says pain in L wrist. Denies dizziness/LH. Ate well per RN.


Objective: 


 Vital Signs











Temp Pulse Resp BP Pulse Ox


 


 97.8 F   44 L  16   133/83 H  92 


 


 01/09/18 12:00  01/09/18 12:00  01/09/18 12:00  01/09/18 12:00  01/09/18 11:12








 Laboratory Results





 01/09/18 04:10 





 01/09/18 04:10 





 











 01/08/18 01/09/18 01/10/18





 11:59 11:59 11:59


 


Intake Total 850 2072 


 


Output Total  300 


 


Balance 850 1772 














- Time Spent With Patient


Time Spent with Patient: greater than 35 minutes


Time Spent with Patient: Greater than 35 minutes spent on this patients care, 

greater than 50% of time spent counseling, educating, and coordinating care 

regarding the above mentioned plan.





- Pending Discharge


Pending Discharge Within 24 Hours: No





- Physical Exam


Constitutional: no apparent distress, chronically ill appearing


Ears, Nose, Mouth, Throat: moist mucous membranes


Cardiovascular: regular rate and rhythym, bradycardia


Respiratory: no respiratory distress, no rales or rhonchi, clear to auscultation

, other (improved compared to yesterday, still with wet cough but less frequent)


Gastrointestinal: normoactive bowel sounds, soft, non-tender abdomen, No 

guarding, No rebound


Skin: warm


Psychiatric: flat affect, poor insight, poor memory, other (was responsive to 

interpretor today- improved)





ICD10 Worksheet


Patient Problems: 


 Problems











Problem Status Onset


 


Left wrist fracture Acute  


 


Pneumonia Acute  


 


TUSHAR (acute kidney injury) Acute  


 


Fall Acute  


 


Generalized weakness Acute  


 


Pyelonephritis, acute Acute

## 2018-01-10 LAB — PLATELET # BLD: 134 10^3/UL (ref 150–400)

## 2018-01-10 RX ADMIN — ENOXAPARIN SODIUM SCH: 100 INJECTION SUBCUTANEOUS at 10:08

## 2018-01-10 RX ADMIN — TIOTROPIUM BROMIDE SCH PUFFS: 18 CAPSULE ORAL; RESPIRATORY (INHALATION) at 09:29

## 2018-01-10 RX ADMIN — DEXTROSE MONOHYDRATE, SODIUM CHLORIDE, AND POTASSIUM CHLORIDE SCH MLS: 50; 4.5; 1.49 INJECTION, SOLUTION INTRAVENOUS at 02:33

## 2018-01-10 RX ADMIN — FINASTERIDE SCH MG: 5 TABLET, FILM COATED ORAL at 09:48

## 2018-01-10 RX ADMIN — ENOXAPARIN SODIUM SCH MG: 100 INJECTION SUBCUTANEOUS at 09:48

## 2018-01-10 RX ADMIN — TAZOBACTAM SODIUM AND PIPERACILLIN SODIUM SCH MLS: 500; 4 INJECTION, SOLUTION INTRAVENOUS at 05:35

## 2018-01-10 RX ADMIN — TAZOBACTAM SODIUM AND PIPERACILLIN SODIUM SCH MLS: 500; 4 INJECTION, SOLUTION INTRAVENOUS at 15:33

## 2018-01-10 RX ADMIN — Medication SCH MG: at 19:58

## 2018-01-10 RX ADMIN — TAZOBACTAM SODIUM AND PIPERACILLIN SODIUM SCH MLS: 500; 4 INJECTION, SOLUTION INTRAVENOUS at 22:15

## 2018-01-10 NOTE — SOAPPROG
SOAP Progress Note


Assessment/Plan: 


Assessment: Emiliano is a 77 year old male who presented to the Emergency 

department on 1/7/18 due to lethargy. He was found to have pneumonia and was 

admitted to the hospitalist service. Patient sustained a left distal radius 

fracture approximately one week prior to admission. He was placed in a splint 

at that time which he subsequently removed. The splint was replaced upon 

admission, however, he removed that splint as well. He has dementia and will 

not remain in the splint.  was present in the room upon evaluation 

of the patient. The patient does not complain of any wrist pain. When asked 

about the splint he reports " I will not wear it, I do not need it."





PE: 


Mild swelling of the left wrist


TTP overlying the distal radius


No pain with gentle wrist flexion and extension


NV intact LUE








Plan:


 I discussed options with Emiliano including placing a short arm fiberglass cast. 

He is refusing to allow me to place him in the cast. I discussed a removable 

splint, however, he is refusing that as well. I have contacted his daughter, 

Brittney (power of ), and left a message on her voicemail including my 

contact information. I have discussed this case with Dr. Bran. We will not 

place him in the cast due to his refusal. He is currently comfortable without 

immobilization. We have advised him not to put weight on the left upper 

extremity. We would not recommend operative intervention due to patient's 

noncompliance with immobilization and noncompliance with non weight bearing 

status. We will follow up with this patient on an outpatient basis and plan on 

conservative treatment.





01/10/18 16:50





01/10/18 17:05





01/10/18 17:39





Objective: 





 Vital Signs











Temp Pulse Resp BP Pulse Ox


 


 36.4 C   72   16   240/90 H  94 


 


 01/10/18 12:00  01/10/18 13:30  01/10/18 12:00  01/10/18 13:30  01/10/18 13:30








 Laboratory Results





 01/10/18 05:44 





 01/10/18 05:44 





 











 01/09/18 01/10/18 01/11/18





 05:59 05:59 05:59


 


Intake Total 672 3850 


 


Output Total 300  


 


Balance 372 3850 














ICD10 Worksheet


Patient Problems: 


 Problems











Problem Status Onset


 


Left wrist fracture Acute  


 


Pneumonia Acute  


 


TUSHAR (acute kidney injury) Acute  


 


Fall Acute  


 


Generalized weakness Acute  


 


Pyelonephritis, acute Acute

## 2018-01-10 NOTE — ASMTCMCOM
CM Note

 

CM Note                       

Notes:

Daya [phone] w Antonino Livingston reports pt has been at  the last 4 mos w/o a payer source 

because dghtr/POA refuses to sign Medicaid paperwork. Daya confirmed APS is open on pt for 

diversion of funds. BM can take pt back and will work w APS on payer source. Daya says if 

palliative is ordered keep in mind pt is "Medicaid pending". BM can also follow up on 

palliative/hospice if ordered. Updates sent to . 



D/c plan of care: return to  when medically stable. 

 

Date Signed:  01/10/2018 10:48 AM

Electronically Signed By:CARLIN Root

## 2018-01-10 NOTE — HOSPPROG
Hospitalist Progress Note


Assessment/Plan: 





1. RUL infiltrate- possible aspiration PNA


-IV abx, pip/breann, Bd Cx NTD so likely switch to PO soon, flu neg


-SLP eval, swallow study done, following diet recs


-O2 support as needed





2. Bradycardia,? asymptomatic


-hold b blockers, > 50s now


-tsh nl


-cardiology consult/echo done but per ortho not appropriate for surgical repair





3. Dementia with possible acute worsening/encephalopathy due to infection/

possible dehydration


-difficult to assess baseline


-left detailed message for daughter yesterday


-ethics consult done as need MD POA as daughter with open CPS case per notes





4. Deaf


-appreciate interpretors availabilty





5. Hypernatremia/hypoK


-mild, gentle IVF hydration


-replace per protocol





6. L wrist fracture


-won't keep splint on


-spoke  with Dr Bran's PA, she will come to eval for cast need as says 

unlikely surgery





7. Anemia


-follow


-iron levels OK





8. HTN


-norvasc, may need to titrate up/add agents





DISPO > 2 mdnts due to multiple med co-morbids, IV abx, pending ethics/?need 

for guardian or MDPOA


FULL CODE- AD scanned but see above re MDPOA


DVT prophy- lovenox


PCP- [place] pt


[place] resident but concern from family re care, CM assisting


Subjective: More alert/awake today. Answers simple questions via interpretor. 

Denies pain today. Trying to get up out of chair with arms, brace off.


Objective: 


 Vital Signs











Temp Pulse Resp BP Pulse Ox


 


 97.6 F   72   16   240/90 H  94 


 


 01/10/18 12:00  01/10/18 13:30  01/10/18 12:00  01/10/18 13:30  01/10/18 13:30








 Laboratory Results





 01/10/18 05:44 





 01/10/18 05:44 





 











 01/09/18 01/10/18 01/11/18





 11:59 11:59 11:59


 


Intake Total 2072 2450 


 


Output Total 300  


 


Balance 1772 2450 














- Time Spent With Patient


Time Spent with Patient: greater than 35 minutes


Time Spent with Patient: Greater than 35 minutes spent on this patients care, 

greater than 50% of time spent counseling, educating, and coordinating care 

regarding the above mentioned plan.





- Physical Exam


Constitutional: no apparent distress, not in pain


Eyes: anicteric sclera


Ears, Nose, Mouth, Throat: moist mucous membranes, hard of hearing (deaf)


Cardiovascular: regular rate and rhythym, No edema


Respiratory: no respiratory distress, no rales or rhonchi, clear to auscultation

, other (minnimal cough)


Gastrointestinal: normoactive bowel sounds, soft, non-tender abdomen, No 

guarding, No rebound, No distension


Skin: warm


Psychiatric: poor insight, poor memory, No agitated





ICD10 Worksheet


Patient Problems: 


 Problems











Problem Status Onset


 


Left wrist fracture Acute  


 


Pneumonia Acute  


 


TUSHAR (acute kidney injury) Acute  


 


Fall Acute  


 


Generalized weakness Acute  


 


Pyelonephritis, acute Acute

## 2018-01-11 RX ADMIN — ENOXAPARIN SODIUM SCH MG: 100 INJECTION SUBCUTANEOUS at 09:47

## 2018-01-11 RX ADMIN — ONDANSETRON PRN MG: 2 SOLUTION INTRAMUSCULAR; INTRAVENOUS at 17:53

## 2018-01-11 RX ADMIN — METOPROLOL TARTRATE SCH MG: 1 INJECTION, SOLUTION INTRAVENOUS at 17:53

## 2018-01-11 RX ADMIN — TAZOBACTAM SODIUM AND PIPERACILLIN SODIUM SCH MLS: 500; 4 INJECTION, SOLUTION INTRAVENOUS at 21:52

## 2018-01-11 RX ADMIN — Medication SCH MG: at 21:52

## 2018-01-11 RX ADMIN — TIOTROPIUM BROMIDE SCH: 18 CAPSULE ORAL; RESPIRATORY (INHALATION) at 10:17

## 2018-01-11 RX ADMIN — TAZOBACTAM SODIUM AND PIPERACILLIN SODIUM SCH MLS: 500; 4 INJECTION, SOLUTION INTRAVENOUS at 16:19

## 2018-01-11 RX ADMIN — DILTIAZEM HYDROCHLORIDE SCH MG: 30 TABLET, FILM COATED ORAL at 16:19

## 2018-01-11 RX ADMIN — SODIUM CHLORIDE SCH MLS: 900 INJECTION, SOLUTION INTRAVENOUS at 13:15

## 2018-01-11 RX ADMIN — SODIUM CHLORIDE SCH MLS: 900 INJECTION, SOLUTION INTRAVENOUS at 14:50

## 2018-01-11 RX ADMIN — TAZOBACTAM SODIUM AND PIPERACILLIN SODIUM SCH MLS: 500; 4 INJECTION, SOLUTION INTRAVENOUS at 05:18

## 2018-01-11 RX ADMIN — DILTIAZEM HYDROCHLORIDE SCH MG: 30 TABLET, FILM COATED ORAL at 21:51

## 2018-01-11 RX ADMIN — FINASTERIDE SCH MG: 5 TABLET, FILM COATED ORAL at 09:40

## 2018-01-11 NOTE — ASMTCMCOM
CM Note

 

CM Note                       

Notes:

Pt. was transfered to PCU today due to heightened medical needs.  Note: Pt. not 

decisional.  Pt. deaf and mute since childhood.  [facility] interpreters here.



SW  colleague did not hear back from APS yesterday.  SWer called again today. APS main line 

worker stated she would try to have assigned worker call SWer.

 

SWer spoke w/ daughter Earlene on phone today.  Discussed her stressors and APS report.  Earlene denies any 


financial malfeasance and wanted to know status of father's medical situation.  Referred Earlene to MD 

for an update.  Earlene states she is Pt's financial POA, but not official medical POA.  



Terra Mccoy came to do Ethics consult.  Terra establised Earlene as medical proxy decision maker for 

the time being.  

 

Eventually worker left SWer a  msg - Erasmo Maxine [phone].  Erasmo stated  is 

tracking down bank statements due to accusations related to financial exploitation.  SWer left 

return msg to find out if there is any reason [facility] cannot rely on dtr as medical proxy decision 

maker.  Awaiting his response.  Going forward as if dtr is proxy decision maker.



Palliative Care consulted by Hospitalist and team set up palliative consultation with dtr Earlene 

[phone] for tomorrow, Friday 1/12/18 at 10:00.  SWer plans to attend.  Note:  Pt. is still 

Full Code.  



D/c plan at this time:  [facility] vs Inpatient Hospice vs SNF hospice

 

Date Signed:  01/11/2018 02:11 PM

Electronically Signed By:Debo Gorman LCSW

## 2018-01-11 NOTE — HOSPPROG
Hospitalist Progress Note


Assessment/Plan: 


Patient is a 77-year-old male who resides at [facility].  He was admitted 

with weakness lethargy and confusion.  He fractured his wrist approximately a 

week ago /is wearing a splint.  He needs surgery in the next 2 weeks.  He has 

been refusing to wear the splint.  Today a STAT team called, patient had a 

heart rate of 160.  Unclear if he was in rapid AFib or SVT. Over several 

minutes and getting him back to bed heart rate decreased to 110 and is a sinus 

tachycardia.  Patient is unable to tell me if he is having pain or short of 

breath.  Per the nursing staff he had a large bout of emesis.  Will get a stat 

chest x-ray, stat abdominal x-ray, arterial blood gas, an EKG.  Will transfer 

him to PCU for a higher level of care.





* right upper lobe infiltrate possible aspiration pneumonia


-blood cultures no growth to date


-concern he still aspirating 


-will get a repeat chest x-ray now





* large bout of emesis


-unable to get information from the patient been through a  if he is 

having any pain or nausea


-will get an abdominal x-ray to rule out any type of obstruction





* tachycardia


-initially was atrial fibrillation with RVR and then SVT


-now having some sinus tachycardia- will transfer to PCU


Yesterday had some bradycardia, his beta-blocker had been stopped, likely 

causing the tachycardia today


-previous provider spoke w cardiology/ they are to see





* dementia with possible worsening encephalopathy


-ethics evaluated patient and noted he is not decisional; for now his daughter 

is medical power of 


-awaiting from Adult protective Services to get further info, case management 

actively involved with the situation





* deaf


- is at the bedside.  The patient does not interact much with her





* hypernatremia, hypokalemia





* left wrist fracture


-previous provider spoke with Dr. frazier's physician assistant the to evaluate 

for cast placement





* anemia





* hypertension


-increased his Norvasc dose





*plan: transfer to PCU for closer monitoring, spoke with cardiology; they will 

see him. Will give him Lopressor IV with parameters.  Palliative care ordered.  

Called his daughter Brittney and updated her on the plan of care.  There is a 

meeting for 10:00 a.m. tomorrow.











Subjective: Patient is unable to give me much information even though there is 

a sign  in the room.


Objective: 


 Vital Signs











Temp Pulse Resp BP Pulse Ox


 


 36.7 C   84   18   142/97 H  95 


 


 01/11/18 11:19  01/11/18 11:19  01/11/18 11:19  01/11/18 11:19  01/11/18 11:19








 Laboratory Results





 01/10/18 05:44 





 01/11/18 05:06 





 











 01/10/18 01/11/18 01/12/18





 05:59 05:59 05:59


 


Intake Total 3850 118 


 


Balance 3850 118 














- Physical Exam


Constitutional: chronically ill appearing, other (Very thin)


Eyes: PERRL


Ears, Nose, Mouth, Throat: hearing normal


Cardiovascular: tachycardia


Respiratory: no respiratory distress, reduced air movement


Skin: warm, No normal color (Pale in color)


Musculoskeletal: generalized weakness


Neurologic: other (Alert)





ICD10 Worksheet


Patient Problems: 


 Problems











Problem Status Onset


 


Pyelonephritis, acute Acute  


 


Fall Acute  


 


TUSHAR (acute kidney injury) Acute  


 


Generalized weakness Acute  


 


Left wrist fracture Acute  


 


Pneumonia Acute

## 2018-01-11 NOTE — GCON
[f rep st]



                                                                    CONSULTATION





CARDIOLOGY CONSULTATION



DATE OF CONSULTATION:  01/11/2018



PRIMARY CARE PHYSICIAN:  Federico Betancourt DO



REASON FOR CONSULTATION:  We were asked by Sherry Tamayo of Hospital Medicine to evaluate the patie
nt for his possible SVT from earlier today.



HISTORY OF PRESENT ILLNESS:  The patient is a 77-year-old male, who has dementia, deafness and COPD w
castillo was admitted from [facility] for weakness, lethargy, confusion and a wrist fracture.  Apparentl
y, he had a fall at his rehab facility 2 weeks prior to admission.  He was admitted to the [facility] to his confusion. Apparently, it is unclear what his baseline mentation is.  Cardiology has been a
sked to see him now on a total of 2 occasions.  The first was due to bradycardia with heart rates in 
what appears to be 40s in sinus rhythm. After cessation of his propranolol, this has resolved.  When 
cardiology tried to visit with patient, the patient was unarousable.  We are once again consulted for
 what is being called SVT. No strips are available on the chart.  Telemetry is unremarkable due to sendy phillips being transferred from a different floor.  The patient again is asleep and is unarousable.  Upo
n review of chart notes, it is documented that he was asymptomatic with his episode of what is docume
nted as SVT at a rate of 160.



PAST MEDICAL HISTORY:  

1.  Dementia.

2.  Deafness.

3.  BPH.

4.  COPD.



PAST SURGICAL HISTORY:  None are listed.



OUTPATIENT MEDICATIONS:  Include Senokot, propranolol, Klonopin, Spiriva, vitamin D3, melatonin, catarino
steride, Lexapro, sumatriptan, albuterol and Tylenol.



ALLERGIES:  Iodine.



SOCIAL HISTORY:  The patient is a resident of [facility].  He is listed as a former smoker.



REVIEW OF SYSTEMS:  Unobtainable.



PHYSICAL EXAM:  VITAL SIGNS:  Blood pressure 129/97, heart rate 95, respirations 14, O2 saturation 97
% on 4.5 L/minute, temperature of 98 degrees.  GENERAL: He is an elderly, cachectic male, asleep.  HE
ART:  Regular rate and rhythm.  LUNGS:  Clear in the anterior fields.  SKIN:  Warm and dry.



LABORATORY DATA:  CBC with WBC 5.71, hemoglobin 11.5, hematocrit 32.9, platelet count of 134. BMP wit
h sodium 147, potassium 3.6, chloride 107, CO2 24, BUN 23, creatinine 1.3, glucose of 136. Telemetry 
reviewed shows sinus rhythm.  A 12-lead ECG from 01/11/2018 at 12 o'clock shows sinus rhythm, with oc
casional PACs, 1st degree AV block, LAFB and diffuse ST-T wave abnormalities, LVH by voltage. On 01/0
9/2018, echocardiogram done in this admission shows normal LV size, LVEF does not appear to be docume
nted.  There is mild LVH, normal left atrial size, trivial MR, trivial to mild TR, trivial anterior p
ericardial effusion.



IMPRESSION AND PLAN:  The patient is a 77-year-old male, who is admitted with confusion with unclear 
baseline mentation.

1.  Possible supraventricular tachycardia.  No strips are available at this time for review.  He has 
no evidence of pre-excitation on his 12-lead ECG.  It is unclear whether patient is symptomatic with 
this.  We will change his amlodipine, that was started in this admission for hypertension, to diltiaz
em.  This will be given at a low dose as patient has had bradycardia on propranolol and diltiazem as 
an AV mallory agent.  Given that his confusion currently seems pretty severe, we would not recommend an
y aggressive cardiac procedures with this patient.

2.  Bradycardia.  This is unclear whether symptomatic or not. Currently, heart rates are improved and
 was likely a result of propranolol which has now been metabolized by his system.  No further workup 
recommended.





Job #:  [id number]

## 2018-01-11 NOTE — CPEKG
Heart Rate: 95

RR Interval: 632

P-R Interval: 200

QRSD Interval: 94

QT Interval: 352

QTC Interval: 443

P Axis: 68

QRS Axis: -44

T Wave Axis: 132

EKG Severity - ABNORMAL ECG -

EKG Impression: SINUS TACHYCARDIA

EKG Impression: MULTIPLE ATRIAL PREMATURE COMPLEXES

EKG Impression: LVH WITH SECONDARY REPOL ABNRM

EKG Impression: PROBABLE INFERIOR INFARCT, AGE INDETERMINATE

EKG Impression: ST DEPRESSION, CONSIDER ISCHEMIA, ANT-LAT LDS

Electronically Signed By: Brice Martin 11-Jan-2018 15:25:42

## 2018-01-12 PROCEDURE — 0T9B8ZZ DRAINAGE OF BLADDER, VIA NATURAL OR ARTIFICIAL OPENING ENDOSCOPIC: ICD-10-PCS | Performed by: UROLOGY

## 2018-01-12 RX ADMIN — ONDANSETRON PRN MG: 2 SOLUTION INTRAMUSCULAR; INTRAVENOUS at 00:43

## 2018-01-12 RX ADMIN — DILTIAZEM HYDROCHLORIDE SCH: 30 TABLET, FILM COATED ORAL at 17:57

## 2018-01-12 RX ADMIN — FINASTERIDE SCH: 5 TABLET, FILM COATED ORAL at 17:59

## 2018-01-12 RX ADMIN — TAZOBACTAM SODIUM AND PIPERACILLIN SODIUM SCH MLS: 500; 4 INJECTION, SOLUTION INTRAVENOUS at 08:42

## 2018-01-12 RX ADMIN — TAZOBACTAM SODIUM AND PIPERACILLIN SODIUM SCH MLS: 500; 4 INJECTION, SOLUTION INTRAVENOUS at 18:59

## 2018-01-12 RX ADMIN — METOPROLOL TARTRATE SCH: 1 INJECTION, SOLUTION INTRAVENOUS at 01:18

## 2018-01-12 RX ADMIN — ENOXAPARIN SODIUM SCH: 100 INJECTION SUBCUTANEOUS at 12:02

## 2018-01-12 RX ADMIN — METOPROLOL TARTRATE SCH MG: 1 INJECTION, SOLUTION INTRAVENOUS at 05:33

## 2018-01-12 RX ADMIN — METOPROLOL TARTRATE SCH: 1 INJECTION, SOLUTION INTRAVENOUS at 17:57

## 2018-01-12 RX ADMIN — TIOTROPIUM BROMIDE SCH: 18 CAPSULE ORAL; RESPIRATORY (INHALATION) at 08:29

## 2018-01-12 RX ADMIN — TAZOBACTAM SODIUM AND PIPERACILLIN SODIUM SCH MLS: 500; 4 INJECTION, SOLUTION INTRAVENOUS at 21:58

## 2018-01-12 RX ADMIN — Medication SCH MG: at 21:11

## 2018-01-12 NOTE — HOSPPROG
Hospitalist Progress Note


Assessment/Plan: 





Patient is a 77-year-old male who resides at [facility].  He was admitted 

with weakness lethargy and confusion.  He fractured his wrist approximately a 

week ago /is wearing a splint.  He needs surgery in the next 2 weeks.  He has 

been refusing to wear the splint.  Today a STAT team called, patient had a 

heart rate of 160.  Unclear if he was in rapid AFib or SVT. Over several 

minutes and getting him back to bed heart rate decreased to 110 and is a sinus 

tachycardia.  Patient is unable to tell me if he is having pain or short of 

breath.  Per the nursing staff he had a large bout of emesis.  Will get a stat 

chest x-ray, stat abdominal x-ray, arterial blood gas, an EKG.  





1/12 plan is for hospice are





right upper lobe infiltrate possible aspiration pneumonia


   still on ab- will discuss thi w family given above 





large bout of emesis


   none further





tachycardia


   resolved


   dc meds given above





dementia with possible worsening encephalopathy


   ethics evaluated patient and noted he is not decisional; for now his 

daughter is medical power of 


   hospice





deaf


    is at the bedside.  The patient does not interact much with her





hypernatremia, hypokalemia


   dc lyte protocol





left wrist fracture


   add scheduled tylenol


   swelling noted, he is moving it around w/out grimacing





anemia





hypertension


   dc meds





Subjective: case d/w palliative care NP Puening.  plan is for hospice care


Objective: 


 Vital Signs











Temp Pulse Resp BP Pulse Ox


 


 36.4 C   83   18   120/64   99 


 


 01/12/18 12:00  01/12/18 12:00  01/12/18 12:00  01/12/18 12:00  01/12/18 12:00








 Laboratory Results





 01/10/18 05:44 





 01/12/18 04:14 





 











 01/11/18 01/12/18 01/13/18





 05:59 05:59 05:59


 


Intake Total 118 520 


 


Output Total  501 


 


Balance 118 19 














- Physical Exam


Constitutional: no apparent distress, appears nourished


Eyes: PERRL, anicteric sclera


Ears, Nose, Mouth, Throat: moist mucous membranes, hearing normal


Cardiovascular: regular rate and rhythym, no murmur, rub, or gallop


Respiratory: no respiratory distress


Gastrointestinal: normoactive bowel sounds


Genitourinary: eddy in urethra, other (light red urine in eddy)


Skin: warm, normal color


Musculoskeletal: full muscle strength


Neurologic: No AAOx3





ICD10 Worksheet


Patient Problems: 


 Problems











Problem Status Onset


 


Left wrist fracture Acute  


 


Pneumonia Acute  


 


TUSHAR (acute kidney injury) Acute  


 


Fall Acute  


 


Generalized weakness Acute  


 


Pyelonephritis, acute Acute

## 2018-01-12 NOTE — ASMTCMCOM
CM Note

 

CM Note                       

Notes:

SWer and Palliative Care NP met w/ Pt's daughter Earlene, granddaughter Lisa and Lisa's friend 

Norm in 2N Conference Room for a Palliative Care consult today to discuss Pt's medical situation 

and their desires for his plan of care.



Discussed Pt's medical status.  Family sad and tearful, but want Pt to be comfortable and state 

that he would not want extensive medical interventions at this time near the end of his 

life.  Family wants to move to comfort care and to change code status to DNR.  Pt. may be eligible 

for inpatient hospice.   Instructed SWer to coordinate referral to [facility] for consult for 


admission into inpatient care center.  Sent referral via v[employer]tel. [facility] accepted 

referral.  Plans to meet Earlene at [facility] tomorrow, Saturday 1/13.  No time given. 



SWer called Earlene [phone] to let her know about [facility] coming tomorrow to [facility], but 

gave her [facility] number to get a better idea about the time they will be coming.  



Coordinated w/ 2W RAIMUNDO.  



 



 

 

Date Signed:  01/12/2018 04:58 PM

Electronically Signed By:Debo Gorman LCSW

## 2018-01-12 NOTE — PDCONSULT
Consultant Note: 





Consult request by Dr. Talley difficult eddy, urinary retention





HPI 77M deaf, COPD admitted for PNA with hx BPH and unable to void.  Nurses 

tried multiple times without success.  


Urology called for eddy placement.





ROS +Pain





PMH COPD, BPH, PNA





FH/SH noncontributory





PE


AF


Gen [place], in discomfort due to urinary retention


CV regular


Lungs normal effort


Abd palpable bladder


 blood at urethral meatus





Procedure:


Using sterile technique, I gently attempted placement of a 12F silicone, but 

not able to pass.  I then tried to advance a glidewire but wire would not 

advance past prostate.  I then proceed to further drape him for bedside 

cystoscopy.  I advanced the cystoscope into the urethral meatus and up to an 

area of false passages.  I was able to find the true lumen and advance the 

scope into the bladder.  It was challenging to find the true lumen.  There was 

no blood in the bladder.  A wire was passed into the bladder and the scope 

removed leaving the wire in place.  A 16F [place] tip was advanced over the 

wire with return of dark clear urine, no clots.  The balloon was inflated to 

10ml NS.  I irrigated out 300ml of clear dark urine.  Eddy hooked to stat 

lock.  Procedure considered complete.  He tolerated the procedure well.





A/P


urinary retention due to BPH and difficult eddy due to BPH and multiple false 

passages from prior eddy attempts





Continue eddy for at least 1 week to allow false passages to heal. 


Please do not remove before 1 week as eddy catheter placement was very 

challenging. 





Lala Perez MD


[facility].

## 2018-01-13 VITALS — RESPIRATION RATE: 16 BRPM

## 2018-01-13 RX ADMIN — TIOTROPIUM BROMIDE SCH: 18 CAPSULE ORAL; RESPIRATORY (INHALATION) at 08:54

## 2018-01-13 RX ADMIN — TAZOBACTAM SODIUM AND PIPERACILLIN SODIUM SCH MLS: 500; 4 INJECTION, SOLUTION INTRAVENOUS at 06:06

## 2018-01-13 RX ADMIN — FINASTERIDE SCH MG: 5 TABLET, FILM COATED ORAL at 08:17

## 2018-01-13 NOTE — HOSPPROG
Hospitalist Progress Note


Assessment/Plan: 





Patient is a 77-year-old male who resides at [facility].  





# Goals of care - hospice consulting today





# right upper lobe infiltrate possible aspiration pneumonia- CXR (personally 

reviewed and interpreted) left sided infiltrate/atelectasis


   oxygen saturations 93% on RA


   - continue antibiotics 





# tachycardia


   resolved


   dc meds given above





# dementia with possible worsening encephalopathy


   ethics evaluated patient and noted he is not decisional; for now his 

daughter is medical power of 


   hospice





# deaf-  is at the bedside.  The patient does not interact much with 

her





 # hypernatremia, hypokalemia


   - dc lyte protocol


   - encourage PO





# left wrist fracture-swelling noted, he is moving it around w/out grimacing


   - cont scheduled tylenol


   


# anemia- HCT 32





# hypertension- dc meds


# dispo - suspect to hospice in the next few days





I have discussed the case with RN - focusing on patients comfort today - taking 

little PO


Subjective: denied pain


Objective: 


 Vital Signs











Temp Pulse Resp BP Pulse Ox


 


 36.6 C   74   14   144/78 H  93 


 


 01/13/18 11:02  01/13/18 11:02  01/13/18 11:02  01/13/18 11:02  01/13/18 11:02








 Laboratory Results





 01/10/18 05:44 





 01/12/18 04:14 





 











 01/12/18 01/13/18 01/14/18





 05:59 05:59 05:59


 


Intake Total 520 1900 


 


Output Total 501 1250 


 


Balance 19 650 














- Physical Exam


Constitutional: no apparent distress


Eyes: anicteric sclera


Ears, Nose, Mouth, Throat: moist mucous membranes


Cardiovascular: regular rate and rhythym, systolic murmur


Respiratory: no respiratory distress


Gastrointestinal: normoactive bowel sounds


Genitourinary: no bladder fullness


Skin: warm


Musculoskeletal: No asymmetric calves


Neurologic: No AAOx3


Psychiatric: depressed


Lymph, Heme, Immunologic: no cervical LAD





ICD10 Worksheet


Patient Problems: 


 Problems











Problem Status Onset


 


Left wrist fracture Acute  


 


Palliative care encounter Acute  


 


Pneumonia Acute  


 


TUSHAR (acute kidney injury) Acute  


 


Fall Acute  


 


Generalized weakness Acute  


 


Pyelonephritis, acute Acute

## 2018-01-13 NOTE — ASMTCMCOM
CM Note

 

CM Note                       

Notes:

GERRI Valencia with [facility] here to see patient and daughter Earlene today. Per Annie, patient 

does not qualify for inpatient hospice but does qualify for hospice at a SNF. Earlene is ok with this 

plan. I sent referrals to a few SNF near her home in [place]; we will have to wait and see which 

ones accept patient's insurance. [facility] is happy to follow patient at whatever facility 

he goes to. 



Current CM Discharge plan: SNF w hospice

 

Date Signed:  01/13/2018 01:39 PM

Electronically Signed By:Wen Mendoza RN

## 2018-01-14 VITALS — DIASTOLIC BLOOD PRESSURE: 94 MMHG | HEART RATE: 59 BPM | SYSTOLIC BLOOD PRESSURE: 140 MMHG | OXYGEN SATURATION: 91 %

## 2018-01-14 VITALS — TEMPERATURE: 98.5 F

## 2018-01-14 RX ADMIN — Medication SCH: at 02:36

## 2018-01-14 RX ADMIN — TIOTROPIUM BROMIDE SCH: 18 CAPSULE ORAL; RESPIRATORY (INHALATION) at 08:50

## 2018-01-14 RX ADMIN — Medication SCH MG: at 21:20

## 2018-01-14 RX ADMIN — FINASTERIDE SCH MG: 5 TABLET, FILM COATED ORAL at 08:36

## 2018-01-14 RX ADMIN — ACETAMINOPHEN PRN MG: 325 TABLET ORAL at 08:36

## 2018-01-14 NOTE — HOSPPROG
Hospitalist Progress Note


Assessment/Plan: 





Patient is a 77-year-old male who resides at [facility].  





# Goals of care - arranging SNF hospice placement





# right upper lobe infiltrate possible aspiration pneumonia- CXR (personally 

reviewed and interpreted) left sided infiltrate/atelectasis


   oxygen saturations 90% on RA


   - patient refusing antibiotics


   - discontinue antibiotics 





# severe protein calorie malnutrition - BMI 16 - taking little PO


   - encourage nourishments





# tachycardia


   resolved


   dc meds given above





# dementia with possible worsening encephalopathy


   ethics evaluated patient and noted he is not decisional; for now his 

daughter is medical power of 


   hospice





# deaf-  is at the bedside.  





 # hypernatremia, hypokalemia- electrolytes normalized 


   - dc lyte protocol


   - encourage PO





# left wrist fracture-Wrist XR (personally reviewed and interpreted) displaced 

fractures noted


   stable swelling noted, he is moving  w/out grimacing


   - cont scheduled tylenol


   


# anemia- HCT 32





# hypertension- dc meds


# dispo - suspect to hospice in the next few days





I have discussed the case with RN - will dc antibiotics today - avoiding 

restraints if able


Subjective: denies pain


Objective: 


 Vital Signs











Temp Pulse Resp BP Pulse Ox


 


 36.5 C   72   16   167/86 H  88 L


 


 01/14/18 10:44  01/14/18 10:44  01/14/18 10:44  01/14/18 10:44  01/14/18 10:44








 Laboratory Results





 01/10/18 05:44 





 01/12/18 04:14 





 











 01/13/18 01/14/18 01/15/18





 05:59 05:59 05:59


 


Intake Total 1900 1120 550


 


Output Total 1250 1250 800


 


Balance 650 -130 -250














- Physical Exam


Constitutional: cachectic


Ears, Nose, Mouth, Throat: dry mucous membranes


Cardiovascular: regular rate and rhythym, systolic murmur


Respiratory: no respiratory distress


Gastrointestinal: normoactive bowel sounds


Genitourinary: no bladder fullness


Skin: warm


Musculoskeletal: No asymmetric calves


Neurologic: No AAOx3


Psychiatric: depressed


Lymph, Heme, Immunologic: no cervical LAD





ICD10 Worksheet


Patient Problems: 


 Problems











Problem Status Onset


 


Left wrist fracture Acute  


 


Palliative care encounter Acute  


 


Pneumonia Acute  


 


TUSHAR (acute kidney injury) Acute  


 


Fall Acute  


 


Generalized weakness Acute  


 


Pyelonephritis, acute Acute

## 2018-01-15 RX ADMIN — FINASTERIDE SCH MG: 5 TABLET, FILM COATED ORAL at 10:19

## 2018-01-15 RX ADMIN — TIOTROPIUM BROMIDE SCH: 18 CAPSULE ORAL; RESPIRATORY (INHALATION) at 10:29

## 2018-01-15 NOTE — PDIAF
- Diagnosis


Diagnosis: dementia/pna


Code Status: Do Not Resuscitate





- Medication Management


Discharge Medications: 


 Medications to Continue on Transfer





Albuterol [Proventil Inhaler HFA (*)] 2 puffs IH Q4H PRN 10/19/17 [Last Taken 

Unknown]


Cholecalciferol Vit D3 [Vitamin D3 (*)] 1,000 units PO DAILY 10/19/17 [Last 

Taken 01/06/18]


Escitalopram Oxalate [Lexapro 10 MG] 10 mg PO DAILY 10/19/17 [Last Taken 01/06/ 18]


Finasteride [Proscar 5 MG (*)] 5 mg PO DAILY 10/19/17 [Last Taken 01/06/18]


SUMAtriptan [Imitrex 50 MG (*)] 50 mg PO Q23H PRN 10/19/17 [Last Taken Unknown]


Sennosides [Senokot] 1 each PO BID PRN 10/19/17 [Last Taken Unknown]


clonazePAM [Klonopin (*)] 0.5 mg PO BID 10/19/17 [Last Taken 01/06/18]


Melatonin [Melatonin 3 MG (*)] 3 mg PO HS #30 tab 11/17/17 [Last Taken 01/06/18]


Acetaminophen [Tylenol 325mg (*)] 650 mg PO Q6HRS PRN 01/07/18 [Last Taken 

Unknown]


Tiotropium Inhaler [Spiriva Handihaler] 18 mcg IH DAILY 01/07/18 [Last Taken 01/ 06/18]








Discharge Medications: Refer to the Discharge Home Medication list for PRN 

reason.





- Orders


Services needed: Home Care, Registered Nurse


Home Care Face to Face: I certify that this patient was under my care and that 

I had the required face-to-face encounter meeting the encounter requirements on 

the discharge day.  My findings support the fact that the patient is homebound 

as defined in


Home Care Face to Face Continued: CMS Chapter 7 Medicare Benefits Manual 30.1.1

, The condition of the patient is such that there exists a normal inability to 

leave home and consequently, leaving home would require a considerable and 

taxing effort.


Diet Recommendation: no restrictions on diet


Diet Texture: Dysphagia 2 - Mechanically Altered - Chopped, Ground, Dysphagia 1 

- Pureed, Meds Crushed in Puree


Additional: hospice eval and treat





- Follow Up Care


Current Providers and Referrals: 


Patient,NotPresent [Unknown] - As per Instructions

## 2018-01-15 NOTE — GDS
[f rep st]



                                                             DISCHARGE SUMMARY





DISCHARGE DIAGNOSES:  Include:

1.  Congenital deafness.

2.  Suspected aspiration pneumonia.

3.  Dementia.

4.  Hypernatremia.

5.  Severe protein-calorie malnutrition.



CONSULTATIVE SERVICES:  Include: 

1.  Hospice.  

2.  Palliative Care.



HOSPITAL COURSE:  

1.  Weakness.  Patient was noted to be in a severe state of deconditioning and cachexia.  Palliative 
Care and Hospice were consulted at the request of family and the decision was made to shift care to [facility].  [facility].

2.  Suspected aspiration pneumonia.  Patient was noted on chest imaging to have a lower lobe infiltra
te, was initiated empirically on IV antibiotics.  These were transitioned to p.o.  Patient completed 
a 5-day course of antibiotics without resulting fever, increased leukocytosis, or peripheral markers 
of infection.  Patient was uncomfortable swallowing the antibiotic tablets and the decision was made 
to terminate therapy at 5 days as suspicion for brewing pneumonia was quite low item.

3.  Congenital deafness.  A  was used throughout the patient's stay.

4.  Acute urinary obstruction.  Patient had a Rowan catheter placed, which remains in place at the ti
me of disposition and should remain so ongoing, consistent with his wishes for comfort and hospice ca
re.



MEDICATIONS AT THE TIME OF TRANSFER:  Please reference the med rec printed on 01/15/2018.



FOLLOWUP APPOINTMENTS:  [facility]  



I spent greater than 30 minutes in the planning and coordination of this discharge.





Job #:  [id number]

## 2018-01-15 NOTE — ASMTCMCOM
CM Note

 

CM Note                       

Notes:

Patient discharged back to  where he will receive hospice services from [facility]. Final 

orders via allscripts. PCS form done. He is to be transported via stretcher, Attempted to inform 

daughter Earlene, no answer. CM call back number left. RN aware to call report, CM available should 

other needs arise.

 

Date Signed:  01/15/2018 03:38 PM

Electronically Signed By:Brionna Elder RN